# Patient Record
Sex: FEMALE | Race: WHITE | HISPANIC OR LATINO | Employment: FULL TIME | URBAN - METROPOLITAN AREA
[De-identification: names, ages, dates, MRNs, and addresses within clinical notes are randomized per-mention and may not be internally consistent; named-entity substitution may affect disease eponyms.]

---

## 2018-05-16 LAB
BILIRUB UR QL STRIP: NEGATIVE MG/DL
CLARITY UR: ABNORMAL
COLOR UR: YELLOW
COMMENT (HISTORICAL): ABNORMAL
GLUCOSE UR STRIP-MCNC: NEGATIVE MG/DL
HGB UR QL STRIP.AUTO: 50
KETONES UR STRIP-MCNC: NEGATIVE MG/DL
LEUKOCYTE ESTERASE UR QL STRIP: 25
NITRITE UR QL STRIP: NEGATIVE
PH UR STRIP.AUTO: 7 [PH] (ref 4.5–8)
PREGNANCY TEST URINE (HISTORICAL): NEGATIVE
PROT UR STRIP-MCNC: NEGATIVE MG/DL
SP GR UR STRIP.AUTO: 1.01 (ref 1–1.04)
UROBILINOGEN UR QL STRIP.AUTO: NEGATIVE MG/DL (ref 0–1)

## 2018-07-30 ENCOUNTER — OFFICE VISIT (OUTPATIENT)
Dept: FAMILY MEDICINE CLINIC | Facility: CLINIC | Age: 18
End: 2018-07-30
Payer: COMMERCIAL

## 2018-07-30 VITALS
DIASTOLIC BLOOD PRESSURE: 62 MMHG | HEART RATE: 89 BPM | BODY MASS INDEX: 30.32 KG/M2 | RESPIRATION RATE: 16 BRPM | WEIGHT: 182 LBS | OXYGEN SATURATION: 99 % | HEIGHT: 65 IN | TEMPERATURE: 98.9 F | SYSTOLIC BLOOD PRESSURE: 106 MMHG

## 2018-07-30 DIAGNOSIS — L30.9 ECZEMA, UNSPECIFIED TYPE: Primary | ICD-10-CM

## 2018-07-30 DIAGNOSIS — Z91.010 ALLERGY HISTORY, PEANUTS: ICD-10-CM

## 2018-07-30 DIAGNOSIS — J45.909 MILD ASTHMA WITHOUT COMPLICATION, UNSPECIFIED WHETHER PERSISTENT: ICD-10-CM

## 2018-07-30 PROCEDURE — 3008F BODY MASS INDEX DOCD: CPT | Performed by: SPECIALIST

## 2018-07-30 PROCEDURE — 99213 OFFICE O/P EST LOW 20 MIN: CPT | Performed by: SPECIALIST

## 2018-07-30 RX ORDER — EPINEPHRINE 0.15 MG/.3ML
0.15 INJECTION INTRAMUSCULAR ONCE
Qty: 0.3 ML | Refills: 0 | Status: SHIPPED | OUTPATIENT
Start: 2018-07-30 | End: 2019-03-11

## 2018-07-30 RX ORDER — METHOCARBAMOL 500 MG/1
500 TABLET, FILM COATED ORAL 2 TIMES DAILY
Refills: 0 | COMMUNITY
Start: 2018-05-20 | End: 2018-08-09

## 2018-07-30 RX ORDER — TRIAMCINOLONE ACETONIDE 1 MG/G
CREAM TOPICAL 2 TIMES DAILY
Qty: 30 G | Refills: 0 | Status: SHIPPED | OUTPATIENT
Start: 2018-07-30 | End: 2018-08-09

## 2018-07-30 RX ORDER — EPINEPHRINE 0.15 MG/.3ML
0.15 INJECTION INTRAMUSCULAR ONCE
COMMUNITY
End: 2018-07-30 | Stop reason: SDUPTHER

## 2018-07-30 NOTE — PATIENT INSTRUCTIONS
Avoid  Dust   And  Air  Fresheners   Smoke  And  Obvious  Allergens                   Flu  Vaccine  In  The  Fall 2018

## 2018-07-30 NOTE — PROGRESS NOTES
Assessment/Plan:pt  Has   Long  Hx  Allergies    Asthma   Eczema     Has  Epi  Kit   For  Severe  Allergic  Reactions  Refer  For  consultation  To  dermatiology         Diagnoses and all orders for this visit:    Eczema, unspecified type  -     triamcinolone (KENALOG) 0 1 % cream; Apply topically 2 (two) times a day  -     Ambulatory referral to Dermatology; Future    Allergy history, peanuts  -     EPINEPHrine (EPIPEN JR) 0 15 mg/0 3 mL SOAJ; Inject 0 3 mL (0 15 mg total) into a muscle once for 1 dose    Other orders  -     methocarbamol (ROBAXIN) 500 mg tablet; Take 500 mg by mouth 2 (two) times a day  -     Albuterol Sulfate (VENTOLIN HFA IN); Inhale  -     etonogestrel (NEXPLANON) subdermal implant; 68 mg by Subdermal route once  -     Discontinue: EPINEPHrine (EPIPEN JR) 0 15 mg/0 3 mL SOAJ; Inject 0 15 mg into a muscle once          Subjective:      Patient ID: Casey Gaines is a 19 y o  female  Patient is a 22-year-old female with a long history of eczema some on her left neck her abdomen but mostly                   anticubital area she also has a history of asthma which has improved but she has albuterol  in inhaler as needed she also has an EpiPen for severe reactions         The following portions of the patient's history were reviewed and updated as appropriate: allergies, current medications, past family history, past medical history, past social history, past surgical history and problem list     Review of Systems   Constitutional: Negative for activity change, appetite change, chills, diaphoresis, fatigue, fever and unexpected weight change  HENT: Negative for ear pain, hearing loss and sinus pain  Eyes: Negative for visual disturbance  Respiratory: Negative for cough, chest tightness and shortness of breath  Cardiovascular: Negative for chest pain  Gastrointestinal: Negative for abdominal distention and abdominal pain  Neurological: Negative for weakness  Psychiatric/Behavioral: Negative for agitation and behavioral problems  Objective:      /62 (BP Location: Left arm, Patient Position: Sitting, Cuff Size: Standard)   Pulse 89   Temp 98 9 °F (37 2 °C)   Resp 16   Ht 5' 5" (1 651 m)   Wt 82 6 kg (182 lb)   LMP 07/16/2018 (Approximate)   SpO2 99%   Breastfeeding? No   BMI 30 29 kg/m²          Physical Exam   Constitutional: She is oriented to person, place, and time  She appears well-developed and well-nourished  No distress  HENT:   Head: Normocephalic  Right Ear: External ear normal    Left Ear: External ear normal    Nose: Nose normal    Mouth/Throat: Oropharynx is clear and moist  No oropharyngeal exudate  Eyes: Conjunctivae are normal  Pupils are equal, round, and reactive to light  Neck: No JVD present  Cardiovascular: Normal rate, regular rhythm and normal heart sounds  Pulmonary/Chest: No respiratory distress  She has no wheezes  She has no rales  Abdominal: She exhibits no distension  There is no tenderness  Musculoskeletal: She exhibits no edema, tenderness or deformity  Neurological: She is alert and oriented to person, place, and time  No cranial nerve deficit  Coordination normal    Skin: Skin is warm and dry  Rash noted  She is not diaphoretic  No erythema  No pallor  Typical  Skin  Changes  antecubital  Area  Of  Eczema    Some  Left  Neck    Minimal  abd   Psychiatric: She has a normal mood and affect   Her behavior is normal

## 2018-08-09 ENCOUNTER — HOSPITAL ENCOUNTER (EMERGENCY)
Facility: HOSPITAL | Age: 18
Discharge: HOME | End: 2018-08-09
Attending: EMERGENCY MEDICINE | Admitting: EMERGENCY MEDICINE
Payer: COMMERCIAL

## 2018-08-09 ENCOUNTER — APPOINTMENT (EMERGENCY)
Dept: RADIOLOGY | Facility: HOSPITAL | Age: 18
End: 2018-08-09
Payer: COMMERCIAL

## 2018-08-09 VITALS
WEIGHT: 183 LBS | TEMPERATURE: 98.7 F | RESPIRATION RATE: 19 BRPM | BODY MASS INDEX: 30.45 KG/M2 | OXYGEN SATURATION: 98 % | SYSTOLIC BLOOD PRESSURE: 131 MMHG | HEART RATE: 87 BPM | DIASTOLIC BLOOD PRESSURE: 78 MMHG

## 2018-08-09 DIAGNOSIS — R06.02 SOB (SHORTNESS OF BREATH): ICD-10-CM

## 2018-08-09 DIAGNOSIS — J45.901 ASTHMA EXACERBATION: Primary | ICD-10-CM

## 2018-08-09 LAB
EXT PREG TEST URINE: NORMAL
S PYO AG THROAT QL: NEGATIVE

## 2018-08-09 PROCEDURE — 71046 X-RAY EXAM CHEST 2 VIEWS: CPT

## 2018-08-09 PROCEDURE — 87430 STREP A AG IA: CPT | Performed by: EMERGENCY MEDICINE

## 2018-08-09 PROCEDURE — 99284 EMERGENCY DEPT VISIT MOD MDM: CPT

## 2018-08-09 PROCEDURE — 93005 ELECTROCARDIOGRAM TRACING: CPT

## 2018-08-09 PROCEDURE — 94640 AIRWAY INHALATION TREATMENT: CPT

## 2018-08-09 PROCEDURE — 81025 URINE PREGNANCY TEST: CPT | Performed by: EMERGENCY MEDICINE

## 2018-08-09 RX ORDER — PREDNISONE 20 MG/1
60 TABLET ORAL ONCE
Status: COMPLETED | OUTPATIENT
Start: 2018-08-09 | End: 2018-08-09

## 2018-08-09 RX ORDER — PREDNISONE 20 MG/1
TABLET ORAL
Status: COMPLETED
Start: 2018-08-09 | End: 2018-08-09

## 2018-08-09 RX ORDER — PREDNISONE 20 MG/1
60 TABLET ORAL DAILY
Qty: 15 TABLET | Refills: 0 | Status: SHIPPED | OUTPATIENT
Start: 2018-08-09 | End: 2018-08-13

## 2018-08-09 RX ORDER — ALBUTEROL SULFATE 2.5 MG/3ML
2.5 SOLUTION RESPIRATORY (INHALATION) EVERY 6 HOURS PRN
Qty: 75 ML | Refills: 0 | Status: SHIPPED | OUTPATIENT
Start: 2018-08-09 | End: 2019-03-11

## 2018-08-09 RX ORDER — IPRATROPIUM BROMIDE AND ALBUTEROL SULFATE 2.5; .5 MG/3ML; MG/3ML
3 SOLUTION RESPIRATORY (INHALATION) ONCE
Status: COMPLETED | OUTPATIENT
Start: 2018-08-09 | End: 2018-08-09

## 2018-08-09 RX ORDER — IPRATROPIUM BROMIDE AND ALBUTEROL SULFATE 2.5; .5 MG/3ML; MG/3ML
SOLUTION RESPIRATORY (INHALATION)
Status: COMPLETED
Start: 2018-08-09 | End: 2018-08-09

## 2018-08-09 RX ADMIN — IPRATROPIUM BROMIDE AND ALBUTEROL SULFATE 3 ML: 2.5; .5 SOLUTION RESPIRATORY (INHALATION) at 22:10

## 2018-08-09 RX ADMIN — PREDNISONE 60 MG: 20 TABLET ORAL at 22:10

## 2018-08-09 RX ADMIN — IPRATROPIUM BROMIDE AND ALBUTEROL SULFATE 3 ML: .5; 3 SOLUTION RESPIRATORY (INHALATION) at 22:10

## 2018-08-10 LAB
ATRIAL RATE: 73 BPM
ATRIAL RATE: 75 BPM
P AXIS: 42 DEGREES
P AXIS: 55 DEGREES
PR INTERVAL: 144 MS
PR INTERVAL: 150 MS
QRS AXIS: 22 DEGREES
QRS AXIS: 23 DEGREES
QRSD INTERVAL: 76 MS
QRSD INTERVAL: 80 MS
QT INTERVAL: 398 MS
QT INTERVAL: 400 MS
QTC INTERVAL: 438 MS
QTC INTERVAL: 446 MS
T WAVE AXIS: 10 DEGREES
T WAVE AXIS: 8 DEGREES
VENTRICULAR RATE: 73 BPM
VENTRICULAR RATE: 75 BPM

## 2018-08-10 PROCEDURE — 93010 ELECTROCARDIOGRAM REPORT: CPT | Performed by: INTERNAL MEDICINE

## 2018-08-10 NOTE — DISCHARGE INSTRUCTIONS
Asthma   WHAT YOU NEED TO KNOW:   Asthma is a lung disease that makes breathing difficult  Chronic inflammation and reactions to triggers narrow the airways in the lungs  Asthma can become life-threatening if it is not managed  DISCHARGE INSTRUCTIONS:   Seek care immediately if:   · You have severe shortness of breath  · Your lips or nails turn blue or gray  · The skin around your neck and ribs pulls in with each breath  · You have shortness of breath, even after you take your short-term medicine as directed  · Your peak flow numbers are in the red zone of your AAP  Contact your healthcare provider if:   · You run out of medicine before your next refill is due  · Your symptoms get worse  · You need to take more medicine than usual to control your symptoms  · You have questions or concerns about your condition or care  Medicines:   · Medicines  decrease inflammation, open airways, and make it easier to breathe  Medicines may be inhaled, taken as a pill, or injected  Short-term medicines relieve your symptoms quickly  Long-term medicines are used to prevent future attacks  You may also need medicine to help control your allergies  Ask your healthcare provider for more information about the medicine you are given and how to take it safely  · Take your medicine as directed  Contact your healthcare provider if you think your medicine is not helping or if you have side effects  Tell him or her if you are allergic to any medicine  Keep a list of the medicines, vitamins, and herbs you take  Include the amounts, and when and why you take them  Bring the list or the pill bottles to follow-up visits  Carry your medicine list with you in case of an emergency  Follow up with your healthcare provider as directed: You will need to return to make sure your medicine is working and your symptoms are controlled   You may be referred to an asthma specialist  iMtch Fonseca may be asked to keep a record of your peak flow values and bring it with you to your appointments  Write down your questions so you remember to ask them  Manage your symptoms and prevent future attacks:   · Follow your Asthma Action Plan (AAP)  This is a written plan that you and your healthcare provider create  It explains which medicine you need and when to change doses if necessary  It also explains how you can monitor symptoms and use a peak flow meter  The meter measures how well your lungs are working  · Manage other health conditions , such as allergies, acid reflux, and sleep apnea  · Identify and avoid triggers  These may include pets, dust mites, mold, and cockroaches  · Do not smoke or be around others who smoke  Nicotine and other chemicals in cigarettes and cigars can cause lung damage  Ask your healthcare provider for information if you currently smoke and need help to quit  E-cigarettes or smokeless tobacco still contain nicotine  Talk to your healthcare provider before you use these products  · Ask about the flu vaccine  The flu can make your asthma worse  You may need a yearly flu shot  © 2017 [facility] Information is for End User's use only and may not be sold, redistributed or otherwise used for commercial purposes  All illustrations and images included in CareNotes® are the copyrighted property of [employer]  or [employer]  The above information is an  only  It is not intended as medical advice for individual conditions or treatments  Talk to your doctor, nurse or pharmacist before following any medical regimen to see if it is safe and effective for you  Shortness of Breath   AMBULATORY CARE:   Shortness of breath  is a feeling that you cannot get enough air when you breathe in  You may have this feeling only during activity, or all the time  Your symptoms can range from mild to severe   Shortness of breath may be a sign of a serious health condition that needs immediate care  Seek care immediately if:   · Your signs and symptoms are the same or worse within 24 hours of treatment  · The skin over your ribs or on your neck sinks in when you breathe  · You feel confused or dizzy  Contact your healthcare provider if:   · You have new or worsening symptoms  · You have questions or concerns about your condition or care  Treatment:   · Medicines  may be used to treat the cause of your symptoms  You may need medicine to treat a bacterial infection or reduce anxiety  Other medicines may be used to open your airway, reduce swelling, or remove extra fluid  If you have a heart condition, you may need medicine to help your heart beat more strongly or regularly  · Oxygen  may be given to help you breathe more easily  Manage shortness of breath:   · Create an action plan  You and your healthcare provider can work together to create a plan for how to handle shortness of breath  The plan can include daily activities, treatment changes, and what to do if you have severe breathing problems  · Lean forward on your elbows when you sit  This helps your lungs expand and may make it easier to breathe  · Use pursed-lip breathing any time you feel short of breath  Breathe in through your nose and then slowly breathe out through your mouth with your lips slightly puckered  It should take you twice as long to breathe out as it did to breathe in  · Do not smoke  Nicotine and other chemicals in cigarettes and cigars can cause lung damage and make shortness of breath worse  Ask your healthcare provider for information if you currently smoke and need help to quit  E-cigarettes or smokeless tobacco still contain nicotine  Talk to your healthcare provider before you use these products  · Reach or maintain a healthy weight  Your healthcare provider can help you create a safe weight loss plan if you are overweight  · Exercise as directed    Exercise can help your lungs work more easily  Exercise can also help you lose weight if needed  Try to get at least 30 minutes of exercise most days of the week  Follow up with your healthcare provider or specialist as directed:  Write down your questions so you remember to ask them during your visits  © 2017 [address] Information is for End User's use only and may not be sold, redistributed or otherwise used for commercial purposes  All illustrations and images included in CareNotes® are the copyrighted property of [employer]  or [employer] Parks  The above information is an  only  It is not intended as medical advice for individual conditions or treatments  Talk to your doctor, nurse or pharmacist before following any medical regimen to see if it is safe and effective for you  Wheezing   WHAT YOU NEED TO KNOW:   Wheezing happens when air flows through a narrowed airway  Wheezing can happen when you breathe in, breathe out, or both  Wheezes may sound like a whistle, squeal, groan, or creak  Wheezes may also sound musical or high-pitched  Wheezing cannot be stopped by coughing  Asthma, allergies, or infection are the most common causes of wheezing  A foreign body, asthma, extra mucus, or smoking can also cause wheezing  DISCHARGE INSTRUCTIONS:   Call 911 if:   · You have sudden trouble breathing  · Your throat feels like it is swelling or feels tight  · You are dizzy, lightheaded, confused, or feel faint  · You have chest pain or tightness  Return to the emergency department if:   · You have shortness of breath  · You are coughing up blood  · You have chest pain  Contact your healthcare provider if:   · You have a fever  · Your wheezing does not get better or it gets worse  · You have questions or concerns about your condition or care  Medicines:   · Medicines  decrease inflammation, open airways, and make it easier to breathe       · Take your medicine as directed  Contact your healthcare provider if you think your medicine is not helping or if you have side effects  Tell him of her if you are allergic to any medicine  Keep a list of the medicines, vitamins, and herbs you take  Include the amounts, and when and why you take them  Bring the list or the pill bottles to follow-up visits  Carry your medicine list with you in case of an emergency  Follow up with your healthcare provider as directed: You may be referred to a specialist  Write down your questions so you remember to ask them during your visits  Manage your symptoms:   · Avoid allergy triggers , such as animals, grass, pollen, or dust     · Return to your usual activity as directed  You may need to limit certain activities until you follow up with your healthcare provider or your symptoms improve  Your child may need to avoid sports until his symptoms improve  · Take deep breaths and cough several times a day  This will decrease your risk for a lung infection and help decrease wheezing  Take a deep breath and hold it for as long as you can  Let the air out and then cough strongly  Deep breaths help open your airway  You may be given an incentive spirometer to help you take deep breaths  Put the plastic piece in your mouth and take a slow, deep breath, then let the air out and cough  Repeat these steps 10 times every hour  · Drink liquids as directed  You may need to drink more liquids than usual to thin your mucus and prevent dehydration  Ask how much liquid to drink each day and which liquids are best for you  © 2017 [address] [employer] Information is for End User's use only and may not be sold, redistributed or otherwise used for commercial purposes  All illustrations and images included in CareNotes® are the copyrighted property of [employer]  or [employer] Parks  The above information is an  only   It is not intended as medical advice for individual conditions or treatments  Talk to your doctor, nurse or pharmacist before following any medical regimen to see if it is safe and effective for you

## 2018-08-10 NOTE — ED NOTES
Reports her asthma has been acting up since yesterday  Reports productive cough  Denies fevers   rEports her pump isnt helping     Audrey Caballero, KHALIDA  08/09/18 5948

## 2018-08-10 NOTE — ED PROVIDER NOTES
History  Chief Complaint   Patient presents with    Asthma     Pt has been coughing for several days  STates today has had increasing tightness in chest with coughing, used nebulizer and inhaler w/o relief  Family states "used albuterol inhaler and sodium chloride in nebulizer"  Pt has been hospitalized for asthma as a small child but never intubated    Asthma     Lungs auscultated in triage, slightly diminished but otherwise clear, VSS in triage including SPO2 of between 98-99% and pt speaking in full sentences     Patient is a 75-year-old female with a history of asthma coming in for increasing shortness of breath over the past several days  Patient states she is not on any birth control, had recent surgeries, travel, hemoptysis, fevers  Patient states she was using her inhalers without any relief  She feels my whole lungs and chest are tight  She has no shortness of breath with activity or dyspnea on exertion  Patient does states she has a sore throat and her brother had strep  She has been tolerating p o  well with good urine output and good bowel movements  Patient states that she does not know if she has ever been intubated for her asthma and father cannot recall the last time she was on prednisone        History provided by:  Patient   used: No    Asthma   Severity:  Mild  Onset quality:  Gradual  Timing:  Intermittent  Progression:  Waxing and waning  Chronicity:  Recurrent  Associated symptoms: cough (clear sputum), shortness of breath and sore throat    Associated symptoms: no abdominal pain, no chest pain, no congestion, no ear pain, no fatigue, no fever, no headaches, no loss of consciousness, no myalgias, no nausea, no rash and no vomiting        Prior to Admission Medications   Prescriptions Last Dose Informant Patient Reported? Taking?    Albuterol Sulfate (VENTOLIN HFA IN)  Self Yes No   Sig: Inhale   EPINEPHrine (EPIPEN JR) 0 15 mg/0 3 mL SOAJ   No No   Sig: Inject 0 3 mL (0 15 mg total) into a muscle once for 1 dose   etonogestrel (NEXPLANON) subdermal implant   Yes No   Si mg by Subdermal route once      Facility-Administered Medications: None       Past Medical History:   Diagnosis Date    Allergic     Asthma     Eczema        No past surgical history on file  Family History   Problem Relation Age of Onset    Family history unknown: Yes     I have reviewed and agree with the history as documented  Social History   Substance Use Topics    Smoking status: Never Smoker    Smokeless tobacco: Never Used    Alcohol use No        Review of Systems   Constitutional: Negative for diaphoresis, fatigue and fever  HENT: Positive for sore throat  Negative for congestion and ear pain  Eyes: Negative for visual disturbance  Respiratory: Positive for cough (clear sputum) and shortness of breath  Negative for chest tightness  Cardiovascular: Negative for chest pain and palpitations  Gastrointestinal: Negative for abdominal pain, nausea and vomiting  Genitourinary: Negative for difficulty urinating and dysuria  Musculoskeletal: Negative for back pain, myalgias and neck pain  Skin: Negative for rash  Neurological: Negative for loss of consciousness, weakness and headaches  Psychiatric/Behavioral: Negative for confusion  All other systems reviewed and are negative  Physical Exam  Physical Exam   Constitutional: She is oriented to person, place, and time  She appears well-developed and well-nourished  No distress  HENT:   Head: Normocephalic and atraumatic  Mouth/Throat: Oropharynx is clear and moist    Patient is maintaining airway and maintaining secretion  Uvula midline without any edema  No pharyngeal erythema or exudate  No cervical chain lymphadenopathy  Eyes: Conjunctivae and EOM are normal  Pupils are equal, round, and reactive to light  Neck: Normal range of motion  Neck supple     Cardiovascular: Normal rate, regular rhythm, normal heart sounds and intact distal pulses  No murmur heard  Pulmonary/Chest: Effort normal and breath sounds normal  No stridor  No respiratory distress  She exhibits no tenderness  Abdominal: Soft  Bowel sounds are normal  She exhibits no distension  There is no tenderness  Musculoskeletal: Normal range of motion  She exhibits no edema  Neurological: She is alert and oriented to person, place, and time  No cranial nerve deficit  Skin: Skin is warm  Capillary refill takes less than 2 seconds  She is not diaphoretic  Psychiatric: She has a normal mood and affect  Her behavior is normal  Judgment and thought content normal    Nursing note and vitals reviewed        Vital Signs  ED Triage Vitals [08/09/18 2136]   Temperature Pulse Respirations Blood Pressure SpO2   98 7 °F (37 1 °C) 87 19 131/78 98 %      Temp Source Heart Rate Source Patient Position - Orthostatic VS BP Location FiO2 (%)   Temporal Monitor Sitting Left arm --      Pain Score       6           Vitals:    08/09/18 2136   BP: 131/78   Pulse: 87   Patient Position - Orthostatic VS: Sitting       Visual Acuity      ED Medications  Medications   ipratropium-albuterol (DUO-NEB) 0 5-2 5 mg/3 mL inhalation solution 3 mL (3 mL Nebulization Given 8/9/18 2210)   predniSONE tablet 60 mg (60 mg Oral Given 8/9/18 2210)       Diagnostic Studies  Results Reviewed     Procedure Component Value Units Date/Time    Rapid Strep A Screen Only, Adults [[id number]]  (Normal) Collected:  08/09/18 2216    Lab Status:  Final result Specimen:  Throat from Throat Updated:  08/09/18 2240     Rapid Strep A Screen Negative    POCT pregnancy, urine [[id number]]  (Normal) Resulted:  08/09/18 2216    Lab Status:  Final result Updated:  08/09/18 2216     EXT PREG TEST UR (Ref: Negative) neg preg                 XR chest 2 views    (Results Pending)              Procedures  Procedures       Phone Contacts  ED Phone Contact    ED Course                               MDM  Number of Diagnoses or Management Options  Asthma exacerbation:   SOB (shortness of breath):   Diagnosis management comments:   Patient is a 78-year-old female complaining of increasing shortness of breath  On exam patient is nontoxic appearing in no respiratory distress  Will obtain chest x-ray, rapid strep, urine as well as give DuoNeb and prednisone  EKG INTERPRETATION at 10:21 p m  RHYTHM:  Normal sinus rhythm at 75 beats per minute  AXIS: normal axis  INTERVALS:  Normal axis measured at 150 milliseconds  QRS COMPLEX:  Measured at 76 milliseconds  ST SEGMENT:  Nonspecific ST segment changes  Artifact present  Poor R-wave progression  T-wave flattening from V2 V3 as well as AVF  QT INTERVAL:  QTC measured at 446 millisecond  COMPARED WITH PRIOR   Tomer Allen Interpretation by Adelaida Baer DO    10:53 PM  Upon re-evaluation patient feels better  Lungs clear no respiratory distress  Discussed with patient and family follow-up as well as will give prednisone for 4 days  Per father she does need albuterol solution  Portions of the record may have been created with voice recognition software  Occasional wrong word or "sound a like" substitutions may have occurred due to the inherent limitations of voice recognition software  Read the chart carefully and recognize, using context, where substitutions have occurred           Amount and/or Complexity of Data Reviewed  Clinical lab tests: ordered and reviewed  Tests in the radiology section of CPT®: ordered and reviewed  Tests in the medicine section of CPT®: ordered and reviewed  Independent visualization of images, tracings, or specimens: yes (No acute pathology on chest xray )      CritCare Time    Disposition  Final diagnoses:   Asthma exacerbation   SOB (shortness of breath)     Time reflects when diagnosis was documented in both MDM as applicable and the Disposition within this note     Time User Action Codes Description Comment    8/9/2018 10:42 PM Filipe Ramachandran Add [J45 901] Asthma exacerbation     8/9/2018 10:42 PM Matt Lopez Add [R06 02] SOB (shortness of breath)       ED Disposition     ED Disposition Condition Comment    Discharge  [facility] discharge to home/self care  Condition at discharge: Stable        Follow-up Information     Follow up With Specialties Details Why Contact Info      Schedule an appointment as soon as possible for a visit in 2 days      Theo Ng MD Internal Medicine   [address] [place] [address]  [phone]            Patient's Medications   Discharge Prescriptions    ALBUTEROL (2 5 MG/3 ML) 0 083 % NEBULIZER SOLUTION    Take 1 vial (2 5 mg total) by nebulization every 6 (six) hours as needed for wheezing or shortness of breath       Start Date: 8/9/2018  End Date: --       Order Dose: 2 5 mg       Quantity: 75 mL    Refills: 0    PREDNISONE 20 MG TABLET    Take 3 tablets (60 mg total) by mouth daily for 4 days       Start Date: 8/9/2018  End Date: 8/13/2018       Order Dose: 60 mg       Quantity: 15 tablet    Refills: 0     No discharge procedures on file      ED Provider  Electronically Signed by           Janell Mays DO  08/09/18 1961

## 2018-09-17 ENCOUNTER — OPTICAL OFFICE (OUTPATIENT)
Dept: URBAN - METROPOLITAN AREA CLINIC 143 | Facility: CLINIC | Age: 18
Setting detail: OPHTHALMOLOGY
End: 2018-09-17
Payer: COMMERCIAL

## 2018-09-17 ENCOUNTER — DOCTOR'S OFFICE (OUTPATIENT)
Dept: URBAN - METROPOLITAN AREA CLINIC 136 | Facility: CLINIC | Age: 18
Setting detail: OPHTHALMOLOGY
End: 2018-09-17
Payer: COMMERCIAL

## 2018-09-17 DIAGNOSIS — H52.03: ICD-10-CM

## 2018-09-17 DIAGNOSIS — H53.042: ICD-10-CM

## 2018-09-17 PROCEDURE — V2100 LENS SPHER SINGLE PLANO 4.00: HCPCS | Performed by: OPTOMETRIST

## 2018-09-17 PROCEDURE — V2784 LENS POLYCARB OR EQUAL: HCPCS | Performed by: OPTOMETRIST

## 2018-09-17 PROCEDURE — 92004 COMPRE OPH EXAM NEW PT 1/>: CPT | Performed by: OPTOMETRIST

## 2018-09-17 PROCEDURE — V2020 VISION SVCS FRAMES PURCHASES: HCPCS | Performed by: OPTOMETRIST

## 2018-09-17 ASSESSMENT — REFRACTION_AUTOREFRACTION
OS_CYLINDER: -0.25
OD_CYLINDER: -0.50
OS_AXIS: 165
OD_AXIS: 166
OS_SPHERE: +2.25
OD_SPHERE: +0.50

## 2018-09-17 ASSESSMENT — REFRACTION_MANIFEST
OS_SPHERE: +3.00
OS_SPHERE: +1.75
OU_VA: 20/20
OD_SPHERE: +0.75
OD_VA2: 20/20
OD_VA3: 20/
OS_VA1: 20/20
OD_CYLINDER: SPH
OS_CYLINDER: SPH
OS_CYLINDER: SPH
OS_VA2: 20/
OD_VA1: 20/20
OS_VA2: 20/20
OS_CYLINDER: SPH
OD_CYLINDER: SPH
OD_SPHERE: +2.00
OS_VA3: 20/
OD_CYLINDER: SPH
OS_SPHERE: +3.50
OU_VA: 20/20
OD_SPHERE: +1.50
OS_VA3: 20/
OD_VA2: 20/
OS_VA1: 20/20
OD_VA3: 20/
OD_VA1: 20/20

## 2018-09-17 ASSESSMENT — SPHEQUIV_DERIVED
OS_SPHEQUIV: 2.125
OD_SPHEQUIV: 0.25

## 2018-09-17 ASSESSMENT — REFRACTION_CURRENTRX
OS_OVR_VA: 20/
OS_OVR_VA: 20/
OD_OVR_VA: 20/
OS_OVR_VA: 20/
OD_OVR_VA: 20/
OD_OVR_VA: 20/

## 2018-09-17 ASSESSMENT — CONFRONTATIONAL VISUAL FIELD TEST (CVF)
OD_FINDINGS: FULL
OS_FINDINGS: FULL

## 2018-09-17 ASSESSMENT — VISUAL ACUITY
OS_BCVA: 20/25-1
OD_BCVA: 20/25

## 2018-09-28 ENCOUNTER — HOSPITAL ENCOUNTER (EMERGENCY)
Facility: HOSPITAL | Age: 18
Discharge: HOME | End: 2018-09-28
Attending: EMERGENCY MEDICINE | Admitting: EMERGENCY MEDICINE
Payer: COMMERCIAL

## 2018-09-28 VITALS
SYSTOLIC BLOOD PRESSURE: 138 MMHG | TEMPERATURE: 98.2 F | OXYGEN SATURATION: 100 % | RESPIRATION RATE: 18 BRPM | WEIGHT: 183.42 LBS | BODY MASS INDEX: 30.52 KG/M2 | HEART RATE: 85 BPM | DIASTOLIC BLOOD PRESSURE: 77 MMHG

## 2018-09-28 DIAGNOSIS — J02.9 PHARYNGITIS: Primary | ICD-10-CM

## 2018-09-28 PROCEDURE — 99282 EMERGENCY DEPT VISIT SF MDM: CPT

## 2018-09-28 RX ORDER — CLINDAMYCIN HYDROCHLORIDE 150 MG/1
300 CAPSULE ORAL EVERY 8 HOURS SCHEDULED
Qty: 60 CAPSULE | Refills: 0 | Status: SHIPPED | OUTPATIENT
Start: 2018-09-28 | End: 2018-10-08

## 2018-09-28 NOTE — DISCHARGE INSTRUCTIONS

## 2018-11-03 ENCOUNTER — HOSPITAL ENCOUNTER (EMERGENCY)
Facility: HOSPITAL | Age: 18
Discharge: HOME | End: 2018-11-03
Attending: EMERGENCY MEDICINE | Admitting: EMERGENCY MEDICINE
Payer: COMMERCIAL

## 2018-11-03 VITALS
HEIGHT: 67 IN | RESPIRATION RATE: 18 BRPM | TEMPERATURE: 97.6 F | HEART RATE: 84 BPM | WEIGHT: 180.4 LBS | DIASTOLIC BLOOD PRESSURE: 84 MMHG | OXYGEN SATURATION: 98 % | BODY MASS INDEX: 28.31 KG/M2 | SYSTOLIC BLOOD PRESSURE: 124 MMHG

## 2018-11-03 DIAGNOSIS — N39.0 UTI (URINARY TRACT INFECTION): Primary | ICD-10-CM

## 2018-11-03 LAB
BACTERIA UR QL AUTO: ABNORMAL /HPF
BILIRUB UR QL STRIP: NEGATIVE
CLARITY UR: CLEAR
COLOR UR: YELLOW
EXT PREG TEST URINE: NEGATIVE
GLUCOSE UR STRIP-MCNC: NEGATIVE MG/DL
HGB UR QL STRIP.AUTO: 50
KETONES UR STRIP-MCNC: NEGATIVE MG/DL
LEUKOCYTE ESTERASE UR QL STRIP: 100
NITRITE UR QL STRIP: NEGATIVE
NON-SQ EPI CELLS URNS QL MICRO: ABNORMAL /HPF
PH UR STRIP.AUTO: 6 [PH] (ref 4.5–8)
PROT UR STRIP-MCNC: NEGATIVE MG/DL
RBC #/AREA URNS AUTO: ABNORMAL /HPF
SP GR UR STRIP.AUTO: 1.02 (ref 1–1.04)
UROBILINOGEN UA: NEGATIVE MG/DL
WBC #/AREA URNS AUTO: ABNORMAL /HPF

## 2018-11-03 PROCEDURE — 81001 URINALYSIS AUTO W/SCOPE: CPT | Performed by: EMERGENCY MEDICINE

## 2018-11-03 PROCEDURE — 99283 EMERGENCY DEPT VISIT LOW MDM: CPT

## 2018-11-03 PROCEDURE — 81025 URINE PREGNANCY TEST: CPT | Performed by: PHYSICIAN ASSISTANT

## 2018-11-03 PROCEDURE — 81003 URINALYSIS AUTO W/O SCOPE: CPT | Performed by: EMERGENCY MEDICINE

## 2018-11-03 RX ORDER — NITROFURANTOIN 25; 75 MG/1; MG/1
100 CAPSULE ORAL 2 TIMES DAILY
Qty: 10 CAPSULE | Refills: 0 | Status: SHIPPED | OUTPATIENT
Start: 2018-11-03 | End: 2019-03-11

## 2018-11-03 NOTE — DISCHARGE INSTRUCTIONS
Urinary Tract Infection in Women, Ambulatory Care   GENERAL INFORMATION:   A urinary tract infection (UTI)  is caused by bacteria that get inside your urinary tract  Most bacteria that enter your urinary tract are expelled when you urinate  If the bacteria stay in your urinary tract, you may get an infection  Your urinary tract includes your kidneys, ureters, bladder, and urethra  Urine is made in your kidneys, and it flows from the ureters to the bladder  Urine leaves the bladder through the urethra  A UTI is more common in your lower urinary tract, which includes your bladder and urethra  Common symptoms include the following:   · Urinating more often or waking from sleep to urinate    · Pain or burning when you urinate    · Pain or pressure in your lower abdomen     · Urine that smells bad    · Blood in your urine    · Leaking urine  Seek immediate care for the following symptoms:   · Urinating very little or not at all    · Vomiting    · Shaking chills with a fever    · Side or back pain that gets worse  Treatment for a UTI  may include medicines to treat a bacterial infection  You may also need medicines to decrease pain and burning, or decrease the urge to urinate often  Prevent a UTI:   · Urinate when you feel the urge  Do not hold your urine  Urinate as soon as you feel you have to  · Drink liquids as directed  Ask how much liquid to drink each day and which liquids are best for you  You may need to drink more fluids than usual to help flush out the bacteria  Do not drink alcohol, caffeine, and citrus juices  These can irritate your bladder and increase your symptoms  · Apply heat  on your abdomen for 20 to 30 minutes every 2 hours for as many days as directed  Heat helps decrease discomfort and pressure in your bladder  Follow up with your healthcare provider as directed:  Write down your questions so you remember to ask them during your visits     CARE AGREEMENT:   You have the right to help plan your care  Learn about your health condition and how it may be treated  Discuss treatment options with your caregivers to decide what care you want to receive  You always have the right to refuse treatment  The above information is an  only  It is not intended as medical advice for individual conditions or treatments  Talk to your doctor, nurse or pharmacist before following any medical regimen to see if it is safe and effective for you  © 2014 [address] is for End User's use only and may not be sold, redistributed or otherwise used for commercial purposes  All illustrations and images included in CareNotes® are the copyrighted property of [employer]  or Андрей Parks

## 2018-11-03 NOTE — ED PROVIDER NOTES
History  Chief Complaint   Patient presents with    Possible UTI     patient states that she is having burning and frequency while urinating started monday, denies back pain, denies fevers, states took OTC meds, AZO,  to help with UTI but it didn't help       History provided by:  Patient   used: No    Medical Problem   Location:  Pt with buring  with urination   Severity:  Mild  Onset quality:  Gradual  Duration:  4 days  Timing:  Constant  Chronicity:  Recurrent  Associated symptoms: no abdominal pain, no chest pain, no congestion, no cough, no diarrhea, no ear pain, no fatigue, no fever, no headaches, no loss of consciousness, no myalgias, no nausea, no rash, no rhinorrhea, no shortness of breath, no sore throat, no vomiting and no wheezing        Prior to Admission Medications   Prescriptions Last Dose Informant Patient Reported? Taking? Albuterol Sulfate (VENTOLIN HFA IN)  Self Yes No   Sig: Inhale   EPINEPHrine (EPIPEN JR) 0 15 mg/0 3 mL SOAJ   No No   Sig: Inject 0 3 mL (0 15 mg total) into a muscle once for 1 dose   albuterol (2 5 mg/3 mL) 0 083 % nebulizer solution   No No   Sig: Take 1 vial (2 5 mg total) by nebulization every 6 (six) hours as needed for wheezing or shortness of breath   etonogestrel (NEXPLANON) subdermal implant   Yes No   Si mg by Subdermal route once      Facility-Administered Medications: None       Past Medical History:   Diagnosis Date    Allergic     Asthma     Eczema        History reviewed  No pertinent surgical history  Family History   Problem Relation Age of Onset    Family history unknown: Yes     I have reviewed and agree with the history as documented  Social History   Substance Use Topics    Smoking status: Never Smoker    Smokeless tobacco: Never Used    Alcohol use No        Review of Systems   Constitutional: Negative  Negative for fatigue and fever  HENT: Negative    Negative for congestion, ear pain, rhinorrhea and sore throat  Eyes: Negative  Respiratory: Negative  Negative for cough, shortness of breath and wheezing  Cardiovascular: Negative for chest pain  Gastrointestinal: Negative  Negative for abdominal pain, diarrhea, nausea and vomiting  Endocrine: Negative  Genitourinary: Positive for dysuria  Musculoskeletal: Negative  Negative for myalgias  Skin: Negative  Negative for rash  Allergic/Immunologic: Negative  Neurological: Negative  Negative for loss of consciousness and headaches  Hematological: Negative  Psychiatric/Behavioral: Negative  All other systems reviewed and are negative  Physical Exam  Physical Exam   Constitutional: She is oriented to person, place, and time  She appears well-developed and well-nourished  HENT:   Head: Normocephalic and atraumatic  Right Ear: External ear normal    Left Ear: External ear normal    Nose: Nose normal    Mouth/Throat: Oropharynx is clear and moist    Eyes: Pupils are equal, round, and reactive to light  Conjunctivae and EOM are normal    Neck: Normal range of motion  Neck supple  Cardiovascular: Normal rate, regular rhythm and normal heart sounds  Pulmonary/Chest: Effort normal and breath sounds normal    Abdominal: Soft  Bowel sounds are normal    Suprapubic pressure    Musculoskeletal: Normal range of motion  Neurological: She is alert and oriented to person, place, and time  Psychiatric: She has a normal mood and affect  Her behavior is normal    Nursing note and vitals reviewed        Vital Signs  ED Triage Vitals [11/03/18 1149]   Temperature Pulse Respirations Blood Pressure SpO2   97 6 °F (36 4 °C) 84 18 124/84 98 %      Temp Source Heart Rate Source Patient Position - Orthostatic VS BP Location FiO2 (%)   Tympanic Monitor Lying Left arm --      Pain Score       --           Vitals:    11/03/18 1149   BP: 124/84   Pulse: 84   Patient Position - Orthostatic VS: Lying       Visual Acuity      ED Medications  Medications - No data to display    Diagnostic Studies  Results Reviewed     Procedure Component Value Units Date/Time    Urine Microscopic [[id number]]  (Abnormal) Collected:  11/03/18 1159    Lab Status:  Final result Specimen:  Urine from Urine, Clean Catch Updated:  11/03/18 1235     RBC, UA 1-2 (A) /hpf      WBC, UA 4-10 (A) /hpf      Epithelial Cells Occasional /hpf      Bacteria, UA None Seen /hpf     UA w Reflex to Microscopic w Reflex to Culture [[id number]]  (Abnormal) Collected:  11/03/18 1159    Lab Status:  Final result Specimen:  Urine from Urine, Clean Catch Updated:  11/03/18 1227     Color, UA Yellow     Clarity, UA Clear     Specific [place], UA 1 020     pH, UA 6 0     Leukocytes,  0 (A)     Nitrite, UA Negative     Protein, UA Negative mg/dl      Glucose, UA Negative mg/dl      Ketones, UA Negative mg/dl      Bilirubin, UA Negative     Blood, UA 50 0 (A)     UROBILINOGEN UA Negative mg/dL     POCT pregnancy, urine [[id number]]  (Normal) Resulted:  11/03/18 1200    Lab Status:  Final result Updated:  11/03/18 1200     EXT PREG TEST UR (Ref: Negative) negative                 No orders to display              Procedures  Procedures       Phone Contacts  ED Phone Contact    ED Course                               MDM  CritCare Time    Disposition  Final diagnoses:   UTI (urinary tract infection)     Time reflects when diagnosis was documented in both MDM as applicable and the Disposition within this note     Time User Action Codes Description Comment    11/3/2018 12:12 PM Anni Sarkar Add [N39 0] UTI (urinary tract infection)       ED Disposition     ED Disposition Condition Comment    Discharge  Hannah Cheatham discharge to home/self care      Condition at discharge: Good        Follow-up Information     Follow up With Specialties Details Why Contact Info Additional [facility] Schedule an appointment as soon as possible for a visit [address], [address] [facility], [address], [address], [phone]          Discharge Medication List as of 11/3/2018 12:13 PM      START taking these medications    Details   nitrofurantoin (MACROBID) 100 mg capsule Take 1 capsule (100 mg total) by mouth 2 (two) times a day, Starting Sat 11/3/2018, Print         CONTINUE these medications which have NOT CHANGED    Details   albuterol (2 5 mg/3 mL) 0 083 % nebulizer solution Take 1 vial (2 5 mg total) by nebulization every 6 (six) hours as needed for wheezing or shortness of breath, Starting Thu 8/9/2018, Print      Albuterol Sulfate (VENTOLIN HFA IN) Inhale, Historical Med      EPINEPHrine (EPIPEN JR) 0 15 mg/0 3 mL SOAJ Inject 0 3 mL (0 15 mg total) into a muscle once for 1 dose, Starting Mon 7/30/2018, Normal      etonogestrel (NEXPLANON) subdermal implant 68 mg by Subdermal route once, Historical Med           No discharge procedures on file      ED Provider  Electronically Signed by           Kenneth Ross PA-C  11/03/18 5213

## 2019-01-01 ENCOUNTER — HOSPITAL ENCOUNTER (EMERGENCY)
Facility: HOSPITAL | Age: 19
Discharge: HOME | End: 2019-01-01
Admitting: EMERGENCY MEDICINE
Payer: COMMERCIAL

## 2019-01-01 VITALS
TEMPERATURE: 98.5 F | HEIGHT: 67 IN | RESPIRATION RATE: 16 BRPM | DIASTOLIC BLOOD PRESSURE: 71 MMHG | HEART RATE: 66 BPM | WEIGHT: 172.19 LBS | BODY MASS INDEX: 27.02 KG/M2 | OXYGEN SATURATION: 100 % | SYSTOLIC BLOOD PRESSURE: 124 MMHG

## 2019-01-01 DIAGNOSIS — N94.6 MENSTRUAL CRAMPS: Primary | ICD-10-CM

## 2019-01-01 LAB
BACTERIA UR QL AUTO: ABNORMAL /HPF
BILIRUB UR QL STRIP: NEGATIVE
CLARITY UR: CLEAR
COLOR UR: ABNORMAL
EXT PREG TEST URINE: NEGATIVE
GLUCOSE UR STRIP-MCNC: NEGATIVE MG/DL
HGB UR QL STRIP.AUTO: 50
KETONES UR STRIP-MCNC: ABNORMAL MG/DL
LEUKOCYTE ESTERASE UR QL STRIP: NEGATIVE
MUCOUS THREADS UR QL AUTO: ABNORMAL
NITRITE UR QL STRIP: NEGATIVE
NON-SQ EPI CELLS URNS QL MICRO: ABNORMAL /HPF
PH UR STRIP.AUTO: 6 [PH] (ref 4.5–8)
PROT UR STRIP-MCNC: NEGATIVE MG/DL
RBC #/AREA URNS AUTO: ABNORMAL /HPF
SP GR UR STRIP.AUTO: 1.02 (ref 1–1.04)
UROBILINOGEN UA: NEGATIVE MG/DL
WBC #/AREA URNS AUTO: ABNORMAL /HPF

## 2019-01-01 PROCEDURE — 96372 THER/PROPH/DIAG INJ SC/IM: CPT

## 2019-01-01 PROCEDURE — 81025 URINE PREGNANCY TEST: CPT | Performed by: PHYSICIAN ASSISTANT

## 2019-01-01 PROCEDURE — 99284 EMERGENCY DEPT VISIT MOD MDM: CPT

## 2019-01-01 PROCEDURE — 81001 URINALYSIS AUTO W/SCOPE: CPT | Performed by: PHYSICIAN ASSISTANT

## 2019-01-01 RX ORDER — NAPROXEN 500 MG/1
500 TABLET ORAL 2 TIMES DAILY WITH MEALS
Qty: 10 TABLET | Refills: 0 | Status: SHIPPED | OUTPATIENT
Start: 2019-01-01 | End: 2019-03-11

## 2019-01-01 RX ORDER — KETOROLAC TROMETHAMINE 30 MG/ML
15 INJECTION, SOLUTION INTRAMUSCULAR; INTRAVENOUS ONCE
Status: COMPLETED | OUTPATIENT
Start: 2019-01-01 | End: 2019-01-01

## 2019-01-01 RX ADMIN — KETOROLAC TROMETHAMINE 15 MG: 30 INJECTION, SOLUTION INTRAMUSCULAR at 19:00

## 2019-01-01 NOTE — ED PROVIDER NOTES
History  Chief Complaint   Patient presents with    Abdominal Cramping     pt states she has abdominal crampign with her period  pt states she is passing some clots as well  pt states she has never experinced this type of pain iwth her period in past       This is a 39-year-old female patient whose menstrual cycle started 3 days ago  He presents because she is having more intense cramps than she is accustomed to  No headache blurred vision double vision no cough congestion sore throat nausea vomiting diarrhea or abdominal pain she points directly over her pelvic region center  However is not reproducible by touch  No vaginal discharge no urgency frequency or dysuria  Nothing makes it better or worse she tried nothing over-the-counter  She does have a implant for birth control that she was C5 months ago she does states she is passing clots but is not bleeding more than she normally does  Patient's abdomen is nonsurgical and not reproducible  She was given Toradol for cramps ibuprofen for outpatient and she is stable for discharge his pain is not radiate  No CVA tenderness            Prior to Admission Medications   Prescriptions Last Dose Informant Patient Reported? Taking? Albuterol Sulfate (VENTOLIN HFA IN)  Self Yes No   Sig: Inhale   EPINEPHrine (EPIPEN JR) 0 15 mg/0 3 mL SOAJ   No No   Sig: Inject 0 3 mL (0 15 mg total) into a muscle once for 1 dose   albuterol (2 5 mg/3 mL) 0 083 % nebulizer solution   No No   Sig: Take 1 vial (2 5 mg total) by nebulization every 6 (six) hours as needed for wheezing or shortness of breath   etonogestrel (NEXPLANON) subdermal implant   Yes No   Si mg by Subdermal route once   nitrofurantoin (MACROBID) 100 mg capsule   No No   Sig: Take 1 capsule (100 mg total) by mouth 2 (two) times a day      Facility-Administered Medications: None       Past Medical History:   Diagnosis Date    Allergic     Asthma     Eczema        History reviewed   No pertinent surgical history  Family History   Problem Relation Age of Onset    Family history unknown: Yes     I have reviewed and agree with the history as documented  Social History   Substance Use Topics    Smoking status: Never Smoker    Smokeless tobacco: Never Used    Alcohol use No        Review of Systems   All other systems reviewed and are negative  Physical Exam  Physical Exam   Constitutional: She appears well-developed and well-nourished  HENT:   Head: Normocephalic and atraumatic  Right Ear: External ear normal    Left Ear: External ear normal    Nose: Nose normal    Mouth/Throat: Oropharynx is clear and moist    Eyes: Pupils are equal, round, and reactive to light  Conjunctivae are normal    Neck: Normal range of motion  Neck supple  Cardiovascular: Normal rate and regular rhythm  Pulmonary/Chest: Effort normal and breath sounds normal    Abdominal: Soft  Bowel sounds are normal  There is no tenderness  Musculoskeletal: Normal range of motion  Neurological: She is alert  Skin: Skin is warm  Psychiatric: She has a normal mood and affect  Her behavior is normal    Nursing note and vitals reviewed        Vital Signs  ED Triage Vitals [01/01/19 1836]   Temperature Pulse Respirations Blood Pressure SpO2   98 5 °F (36 9 °C) 66 16 124/71 100 %      Temp Source Heart Rate Source Patient Position - Orthostatic VS BP Location FiO2 (%)   Tympanic Monitor Sitting Left arm --      Pain Score       9           Vitals:    01/01/19 1836   BP: 124/71   Pulse: 66   Patient Position - Orthostatic VS: Sitting       Visual Acuity      ED Medications  Medications   ketorolac (TORADOL) injection 15 mg (not administered)       Diagnostic Studies  Results Reviewed     Procedure Component Value Units Date/Time    POCT pregnancy, urine [[id number]]  (Normal) Resulted:  01/01/19 1855    Lab Status:  Final result Updated:  01/01/19 1857     EXT PREG TEST UR (Ref: Negative) NEGATIVE    UA w Reflex to Microscopic [[id number]] Collected:  01/01/19 1855    Lab Status:  No result Specimen:  Urine from Urine, Clean Catch                  No orders to display              Procedures  Procedures       Phone Contacts  ED Phone Contact    ED Course                               MDM  CritCare Time    Disposition  Final diagnoses:   Menstrual cramps     Time reflects when diagnosis was documented in both MDM as applicable and the Disposition within this note     Time User Action Codes Description Comment    1/1/2019  6:59 PM Simón Willard Add [N94 6] Menstrual cramps       ED Disposition     ED Disposition Condition Comment    Discharge  [address] [place] [address] discharge to home/self care  Condition at discharge: Good        Follow-up Information     Follow up With Specialties Details Why Contact Info    Camelia Rivera MD Internal Medicine Schedule an appointment as soon as possible for a visit  [address] [address]  [phone]            Patient's Medications   Discharge Prescriptions    NAPROXEN (EC NAPROSYN) 500 MG EC TABLET    Take 1 tablet (500 mg total) by mouth 2 (two) times a day with meals       Start Date: 1/1/2019  End Date: 1/1/2020       Order Dose: 500 mg       Quantity: 10 tablet    Refills: 0     No discharge procedures on file      ED Provider  Electronically Signed by           June Zhang PA-C  01/01/19 8666

## 2019-01-02 NOTE — DISCHARGE INSTRUCTIONS
Dysmenorrhea   WHAT YOU NEED TO KNOW:   Dysmenorrhea is painful menstrual cramps at or around the time of your monthly period  DISCHARGE INSTRUCTIONS:   Medicines: You may need any of the following:  · NSAIDs  help decrease swelling, pain, and fever  This medicine is available with or without a doctor's order  NSAIDs can cause stomach bleeding or kidney problems in certain people  If you take blood thinner medicine, always ask your healthcare provider if NSAIDs are safe for you  Always read the medicine label and follow directions  · Birth control medicine  may help decrease your pain  This medicine may be birth control pills or an IUD that does not contain copper  · Take your medicine as directed  Contact your healthcare provider if you think your medicine is not helping or if you have side effects  Tell him if you are allergic to any medicine  Keep a list of the medicines, vitamins, and herbs you take  Include the amounts, and when and why you take them  Bring the list or the pill bottles to follow-up visits  Carry your medicine list with you in case of an emergency  Eat low-fat foods: Increase the amount of vegetables and raw seeds you eat  Ask your healthcare provider if you should take vitamin B or magnesium supplements  These will help decrease your pain  Do not eat dairy products or eggs  Apply heat:  Apply heat on your lower abdomen for 20 to 30 minutes every 2 hours for as many days as directed  Heat helps decrease pain and muscle spasms  Manage your stress:  Stress can make your symptoms worse  Try relaxation exercises, such as deep breathing  Exercise regularly:  Ask your healthcare provider about the best exercise plan for you  Exercise can help decrease pain  Keep a record of your pain:  Write down when your pain and periods start and stop  Bring the record with you to your follow-up visits  Do not smoke:  Avoid others who smoke  If you smoke, it is never too late to quit   Smoking can increase your risk for dysmenorrhea  Ask your healthcare provider for information if you need help quitting  Follow up with your healthcare provider or OB-GYN as directed:  Write down your questions so you remember to ask them during your visits  Contact your healthcare provider or OB-GYN if:   · You have anxiety or feel depressed  · Your periods are early, late, or more painful than usual     · You have questions or concerns about your condition or care  Return to the emergency department if:   · You have severe pain  · You have heavy vaginal bleeding and you feel faint  · You have sudden chest pain and trouble breathing  © 2017 [facility] Information is for End User's use only and may not be sold, redistributed or otherwise used for commercial purposes  All illustrations and images included in CareNotes® are the copyrighted property of [employer]  or [employer]  The above information is an  only  It is not intended as medical advice for individual conditions or treatments  Talk to your doctor, nurse or pharmacist before following any medical regimen to see if it is safe and effective for you

## 2019-03-11 ENCOUNTER — APPOINTMENT (EMERGENCY)
Dept: RADIOLOGY | Facility: HOSPITAL | Age: 19
End: 2019-03-11
Payer: COMMERCIAL

## 2019-03-11 ENCOUNTER — HOSPITAL ENCOUNTER (OUTPATIENT)
Facility: HOSPITAL | Age: 19
Setting detail: OBSERVATION
Discharge: HOME | End: 2019-03-13
Attending: EMERGENCY MEDICINE | Admitting: FAMILY MEDICINE
Payer: COMMERCIAL

## 2019-03-11 ENCOUNTER — HOSPITAL ENCOUNTER (EMERGENCY)
Facility: HOSPITAL | Age: 19
Discharge: HOME | End: 2019-03-11
Attending: EMERGENCY MEDICINE | Admitting: EMERGENCY MEDICINE
Payer: COMMERCIAL

## 2019-03-11 VITALS
HEART RATE: 114 BPM | BODY MASS INDEX: 26.3 KG/M2 | SYSTOLIC BLOOD PRESSURE: 132 MMHG | OXYGEN SATURATION: 96 % | TEMPERATURE: 98.6 F | WEIGHT: 167.55 LBS | RESPIRATION RATE: 17 BRPM | HEIGHT: 67 IN | DIASTOLIC BLOOD PRESSURE: 78 MMHG

## 2019-03-11 DIAGNOSIS — J18.9 PNEUMONIA: ICD-10-CM

## 2019-03-11 DIAGNOSIS — J45.21 MILD INTERMITTENT ASTHMA WITH EXACERBATION: Primary | ICD-10-CM

## 2019-03-11 DIAGNOSIS — E87.6 HYPOKALEMIA: ICD-10-CM

## 2019-03-11 DIAGNOSIS — J45.901 ASTHMA EXACERBATION: Primary | ICD-10-CM

## 2019-03-11 DIAGNOSIS — J45.901 ACUTE ASTHMA EXACERBATION: ICD-10-CM

## 2019-03-11 PROBLEM — A41.9 SEPSIS (HCC): Status: ACTIVE | Noted: 2019-03-11

## 2019-03-11 LAB
ALBUMIN SERPL BCP-MCNC: 4.9 G/DL (ref 3–5.2)
ALP SERPL-CCNC: 55 U/L (ref 43–122)
ALT SERPL W P-5'-P-CCNC: 21 U/L (ref 9–52)
ANION GAP SERPL CALCULATED.3IONS-SCNC: 14 MMOL/L (ref 5–14)
APTT PPP: 24 SECONDS (ref 23–34)
AST SERPL W P-5'-P-CCNC: 26 U/L (ref 14–36)
BACTERIA UR QL AUTO: ABNORMAL /HPF
BASOPHILS # BLD AUTO: 0 THOUSANDS/ΜL (ref 0–0.1)
BASOPHILS NFR BLD AUTO: 0 % (ref 0–1)
BILIRUB SERPL-MCNC: 0.4 MG/DL
BILIRUB UR QL STRIP: NEGATIVE
BUN SERPL-MCNC: 6 MG/DL (ref 5–25)
CALCIUM SERPL-MCNC: 9.7 MG/DL (ref 8.4–10.2)
CHLORIDE SERPL-SCNC: 104 MMOL/L (ref 97–108)
CLARITY UR: CLEAR
CO2 SERPL-SCNC: 20 MMOL/L (ref 22–30)
COLOR UR: YELLOW
CREAT SERPL-MCNC: 0.54 MG/DL (ref 0.6–1.2)
EOSINOPHIL # BLD AUTO: 0.3 THOUSAND/ΜL (ref 0–0.4)
EOSINOPHIL NFR BLD AUTO: 4 % (ref 0–6)
ERYTHROCYTE [DISTWIDTH] IN BLOOD BY AUTOMATED COUNT: 13.4 %
EXT PREG TEST URINE: NEGATIVE
FLUAV + FLUBV RNA ISLT NAA+PROBE: NOT DETECTED
FLUAV + FLUBV RNA ISLT NAA+PROBE: NOT DETECTED
GFR SERPL CREATININE-BSD FRML MDRD: 135 ML/MIN/1.73SQ M
GLUCOSE SERPL-MCNC: 91 MG/DL (ref 70–99)
GLUCOSE UR STRIP-MCNC: NEGATIVE MG/DL
HCT VFR BLD AUTO: 42.5 % (ref 36–46)
HGB BLD-MCNC: 13.7 G/DL (ref 12–16)
HGB UR QL STRIP.AUTO: 10
INR PPP: 1.01 (ref 0.89–1.1)
KETONES UR STRIP-MCNC: NEGATIVE MG/DL
LACTATE SERPL-SCNC: 2.8 MMOL/L (ref 0.7–2)
LEUKOCYTE ESTERASE UR QL STRIP: NEGATIVE
LYMPHOCYTES # BLD AUTO: 0.6 THOUSANDS/ΜL (ref 0.5–4)
LYMPHOCYTES NFR BLD AUTO: 7 % (ref 25–45)
MCH RBC QN AUTO: 30.2 PG (ref 26–34)
MCHC RBC AUTO-ENTMCNC: 32.3 G/DL (ref 31–36)
MCV RBC AUTO: 94 FL (ref 80–100)
MONOCYTES # BLD AUTO: 0.4 THOUSAND/ΜL (ref 0.2–0.9)
MONOCYTES NFR BLD AUTO: 4 % (ref 1–10)
MUCOUS THREADS UR QL AUTO: ABNORMAL
NEUTROPHILS # BLD AUTO: 7.6 THOUSANDS/ΜL (ref 1.8–7.8)
NEUTS SEG NFR BLD AUTO: 85 % (ref 45–65)
NITRITE UR QL STRIP: NEGATIVE
NON-SQ EPI CELLS URNS QL MICRO: ABNORMAL /HPF
PH UR STRIP.AUTO: 7 [PH]
PLATELET # BLD AUTO: 259 THOUSANDS/UL (ref 150–450)
PMV BLD AUTO: 9.5 FL (ref 8.9–12.7)
POTASSIUM SERPL-SCNC: 3.1 MMOL/L (ref 3.6–5)
PROT SERPL-MCNC: 8.1 G/DL (ref 5.9–8.4)
PROT UR STRIP-MCNC: NEGATIVE MG/DL
PROTHROMBIN TIME: 10.7 SECONDS (ref 9.5–11.6)
RBC # BLD AUTO: 4.55 MILLION/UL (ref 4–5.2)
RBC #/AREA URNS AUTO: ABNORMAL /HPF
SODIUM SERPL-SCNC: 138 MMOL/L (ref 137–147)
SP GR UR STRIP.AUTO: 1.01 (ref 1–1.04)
UROBILINOGEN UA: NEGATIVE MG/DL
WBC # BLD AUTO: 9 THOUSAND/UL (ref 4.5–11)
WBC #/AREA URNS AUTO: ABNORMAL /HPF

## 2019-03-11 PROCEDURE — 85610 PROTHROMBIN TIME: CPT | Performed by: EMERGENCY MEDICINE

## 2019-03-11 PROCEDURE — 85025 COMPLETE CBC W/AUTO DIFF WBC: CPT | Performed by: EMERGENCY MEDICINE

## 2019-03-11 PROCEDURE — 85730 THROMBOPLASTIN TIME PARTIAL: CPT | Performed by: EMERGENCY MEDICINE

## 2019-03-11 PROCEDURE — 80053 COMPREHEN METABOLIC PANEL: CPT | Performed by: EMERGENCY MEDICINE

## 2019-03-11 PROCEDURE — 99283 EMERGENCY DEPT VISIT LOW MDM: CPT

## 2019-03-11 PROCEDURE — 81001 URINALYSIS AUTO W/SCOPE: CPT | Performed by: EMERGENCY MEDICINE

## 2019-03-11 PROCEDURE — 36415 COLL VENOUS BLD VENIPUNCTURE: CPT | Performed by: EMERGENCY MEDICINE

## 2019-03-11 PROCEDURE — 87502 INFLUENZA DNA AMP PROBE: CPT | Performed by: EMERGENCY MEDICINE

## 2019-03-11 PROCEDURE — 99285 EMERGENCY DEPT VISIT HI MDM: CPT

## 2019-03-11 PROCEDURE — 87040 BLOOD CULTURE FOR BACTERIA: CPT | Performed by: EMERGENCY MEDICINE

## 2019-03-11 PROCEDURE — 83605 ASSAY OF LACTIC ACID: CPT | Performed by: EMERGENCY MEDICINE

## 2019-03-11 PROCEDURE — 71046 X-RAY EXAM CHEST 2 VIEWS: CPT

## 2019-03-11 PROCEDURE — 99219 PR INITIAL OBSERVATION CARE/DAY 50 MINUTES: CPT | Performed by: PHYSICIAN ASSISTANT

## 2019-03-11 PROCEDURE — 96374 THER/PROPH/DIAG INJ IV PUSH: CPT

## 2019-03-11 PROCEDURE — 94640 AIRWAY INHALATION TREATMENT: CPT

## 2019-03-11 PROCEDURE — 81025 URINE PREGNANCY TEST: CPT | Performed by: EMERGENCY MEDICINE

## 2019-03-11 RX ORDER — ALBUTEROL SULFATE 90 UG/1
2 AEROSOL, METERED RESPIRATORY (INHALATION) EVERY 4 HOURS PRN
Qty: 1 INHALER | Refills: 0 | Status: SHIPPED | OUTPATIENT
Start: 2019-03-11 | End: 2020-05-18 | Stop reason: SDUPTHER

## 2019-03-11 RX ORDER — LEVOFLOXACIN 5 MG/ML
750 INJECTION, SOLUTION INTRAVENOUS ONCE
Status: COMPLETED | OUTPATIENT
Start: 2019-03-11 | End: 2019-03-11

## 2019-03-11 RX ORDER — POTASSIUM CHLORIDE 750 MG/1
40 TABLET, EXTENDED RELEASE ORAL ONCE
Status: COMPLETED | OUTPATIENT
Start: 2019-03-11 | End: 2019-03-11

## 2019-03-11 RX ORDER — ALBUTEROL SULFATE 2.5 MG/3ML
5 SOLUTION RESPIRATORY (INHALATION) ONCE
Status: COMPLETED | OUTPATIENT
Start: 2019-03-11 | End: 2019-03-11

## 2019-03-11 RX ORDER — MAGNESIUM SULFATE HEPTAHYDRATE 40 MG/ML
2 INJECTION, SOLUTION INTRAVENOUS ONCE
Status: COMPLETED | OUTPATIENT
Start: 2019-03-11 | End: 2019-03-11

## 2019-03-11 RX ORDER — ALBUTEROL SULFATE 2.5 MG/3ML
10 SOLUTION RESPIRATORY (INHALATION) ONCE
Status: COMPLETED | OUTPATIENT
Start: 2019-03-11 | End: 2019-03-11

## 2019-03-11 RX ORDER — ACETAMINOPHEN 325 MG/1
975 TABLET ORAL ONCE
Status: COMPLETED | OUTPATIENT
Start: 2019-03-11 | End: 2019-03-11

## 2019-03-11 RX ORDER — PREDNISONE 20 MG/1
60 TABLET ORAL DAILY
Qty: 15 TABLET | Refills: 0 | Status: SHIPPED | OUTPATIENT
Start: 2019-03-11 | End: 2019-03-13 | Stop reason: HOSPADM

## 2019-03-11 RX ADMIN — SODIUM CHLORIDE 1000 ML: 9 INJECTION, SOLUTION INTRAVENOUS at 23:13

## 2019-03-11 RX ADMIN — ALBUTEROL SULFATE 10 MG: 2.5 SOLUTION RESPIRATORY (INHALATION) at 15:12

## 2019-03-11 RX ADMIN — MAGNESIUM SULFATE IN WATER 2 G: 40 INJECTION, SOLUTION INTRAVENOUS at 22:57

## 2019-03-11 RX ADMIN — IPRATROPIUM BROMIDE 1 MG: 0.5 SOLUTION RESPIRATORY (INHALATION) at 21:36

## 2019-03-11 RX ADMIN — LEVOFLOXACIN 750 MG: 750 INJECTION, SOLUTION INTRAVENOUS at 23:08

## 2019-03-11 RX ADMIN — ALBUTEROL SULFATE 10 MG: 2.5 SOLUTION RESPIRATORY (INHALATION) at 21:37

## 2019-03-11 RX ADMIN — IPRATROPIUM BROMIDE 0.5 MG: 0.5 SOLUTION RESPIRATORY (INHALATION) at 15:13

## 2019-03-11 RX ADMIN — SODIUM CHLORIDE 1000 ML: 9 INJECTION, SOLUTION INTRAVENOUS at 22:57

## 2019-03-11 RX ADMIN — ALBUTEROL SULFATE 5 MG: 2.5 SOLUTION RESPIRATORY (INHALATION) at 20:47

## 2019-03-11 RX ADMIN — IPRATROPIUM BROMIDE 0.5 MG: 0.5 SOLUTION RESPIRATORY (INHALATION) at 20:47

## 2019-03-11 RX ADMIN — ACETAMINOPHEN 975 MG: 325 TABLET ORAL at 22:37

## 2019-03-11 RX ADMIN — POTASSIUM CHLORIDE 40 MEQ: 10 TABLET, EXTENDED RELEASE ORAL at 23:35

## 2019-03-11 NOTE — ED PROVIDER NOTES
History  Chief Complaint   Patient presents with    Asthma     pt states her asthma is acting up  pt states she used a nebulizer tx today with no relief  Patient is a 47-year-old female with a past medical history significant for asthma who has never been intubated for her asthma and has never been admitted to the ICU for her asthma and presents today with chief complaint of wheezing/cough  Patient reports she uses her albuterol pump 1-2 times per day over the past 3 days however does not normally use her albuterol pump as she does not typically have symptoms on a daily basis  Patient reports she only uses her albuterol when she does get sick  Patient reports increased nasal congestion, coughing, wheezing  Patient denies any abdominal pain, nausea, vomiting  Patient denies urinary symptoms at this time  Patient denies any chest pain however does report that at times with her coughing she does experience some shortness of breath  Patient reports she does have a nebulizer at home however reports she has not been using her nebulizer over the past few days  History provided by:  Patient  Asthma   Location:  Wheezing / lungs  Quality:  Wheezing  Severity:  Moderate  Onset quality:  Gradual  Duration:  3 days  Timing:  Constant  Progression:  Unchanged  Chronicity:  New  Context:  Increased nasal congestion / coughing  Relieved by: Albuterol MDI  Worsened by:  Cold air / coughing  Associated symptoms: congestion, cough, fatigue, rhinorrhea, shortness of breath and wheezing    Associated symptoms: no abdominal pain, no chest pain, no diarrhea, no ear pain, no fever, no nausea, no rash, no sore throat and no vomiting        Prior to Admission Medications   Prescriptions Last Dose Informant Patient Reported? Taking?    Albuterol Sulfate (VENTOLIN HFA IN)  Self Yes No   Sig: Inhale   EPINEPHrine (EPIPEN JR) 0 15 mg/0 3 mL SOAJ   No No   Sig: Inject 0 3 mL (0 15 mg total) into a muscle once for 1 dose albuterol (2 5 mg/3 mL) 0 083 % nebulizer solution   No No   Sig: Take 1 vial (2 5 mg total) by nebulization every 6 (six) hours as needed for wheezing or shortness of breath   etonogestrel (NEXPLANON) subdermal implant   Yes No   Si mg by Subdermal route once   naproxen (EC NAPROSYN) 500 MG EC tablet   No No   Sig: Take 1 tablet (500 mg total) by mouth 2 (two) times a day with meals   nitrofurantoin (MACROBID) 100 mg capsule   No No   Sig: Take 1 capsule (100 mg total) by mouth 2 (two) times a day      Facility-Administered Medications: None       Past Medical History:   Diagnosis Date    Allergic     Asthma     Eczema        History reviewed  No pertinent surgical history  Family History   Family history unknown: Yes     I have reviewed and agree with the history as documented  Social History     Tobacco Use    Smoking status: Never Smoker    Smokeless tobacco: Never Used   Substance Use Topics    Alcohol use: No    Drug use: No        Review of Systems   Constitutional: Positive for fatigue  Negative for chills and fever  HENT: Positive for congestion, rhinorrhea and sneezing  Negative for ear pain and sore throat  Eyes: Negative for redness  Respiratory: Positive for cough, shortness of breath and wheezing  Negative for chest tightness  Cardiovascular: Negative for chest pain and palpitations  Gastrointestinal: Negative for abdominal pain, constipation, diarrhea, nausea and vomiting  Genitourinary: Negative for dysuria and hematuria  Musculoskeletal: Negative  Negative for neck pain and neck stiffness  Skin: Negative for rash  Neurological: Negative for dizziness, syncope, light-headedness and numbness  Physical Exam  Physical Exam   Constitutional: She is oriented to person, place, and time  She appears well-developed and well-nourished  HENT:   Head: Normocephalic  Mouth/Throat: Oropharynx is clear and moist  No posterior oropharyngeal erythema   Tonsils are 0 on the right  Tonsils are 0 on the left  No tonsillar exudate  Eyes: No scleral icterus  Cardiovascular: Normal rate and regular rhythm  Pulmonary/Chest: Effort normal  No stridor  No respiratory distress  She has wheezes  Abdominal: Soft  She exhibits no distension  There is no tenderness  Musculoskeletal: Normal range of motion  Neurological: She is alert and oriented to person, place, and time  Skin: Skin is warm and dry  Capillary refill takes less than 2 seconds  Psychiatric: She has a normal mood and affect  Nursing note and vitals reviewed  Vital Signs  ED Triage Vitals [03/11/19 1443]   Temperature Pulse Respirations Blood Pressure SpO2   98 6 °F (37 °C) 102 20 119/67 95 %      Temp Source Heart Rate Source Patient Position - Orthostatic VS BP Location FiO2 (%)   Tympanic Monitor Sitting Left arm --      Pain Score       --           Vitals:    03/11/19 1443   BP: 119/67   Pulse: 102   Patient Position - Orthostatic VS: Sitting       Visual Acuity      ED Medications  Medications   albuterol inhalation solution 10 mg (10 mg Nebulization Given 3/11/19 1512)   ipratropium (ATROVENT) 0 02 % inhalation solution 0 5 mg (0 5 mg Nebulization Given 3/11/19 1513)       Diagnostic Studies  Results Reviewed     None                 No orders to display              Procedures  Procedures       Phone Contacts  ED Phone Contact    ED Course  ED Course as of Mar 11 1610   Mon Mar 11, 2019   1605 Patient was significantly decreased wheezing, patient reports significant improvement in symptoms and reports she feels comfortable for discharge at this time  Return to emergency department criteria was reviewed with the patient, patient verbalized understanding at this time                                    MDM    Disposition  Final diagnoses:   Mild intermittent asthma with exacerbation     Time reflects when diagnosis was documented in both MDM as applicable and the Disposition within this note     Time User Action Codes Description Comment    3/11/2019  4:08 PM Cici Esqueda Add [[address] 21] Mild intermittent asthma with exacerbation       ED Disposition     ED Disposition Condition Date/Time Comment    Discharge Stable Mon Mar 11, 2019  4:07 PM [place] [facility] discharge to home/self care  Follow-up Information     Follow up With Specialties Details Why Brenden Estrada MD Family Medicine Schedule an appointment as soon as possible for a visit   [address] [place] [address] [address]             Patient's Medications   Discharge Prescriptions    ALBUTEROL (5 MG/ML) 0 5 % NEBULIZER SOLUTION    Take 0 5 mL (2 5 mg total) by nebulization every 6 (six) hours as needed for wheezing       Start Date: 3/11/2019 End Date: --       Order Dose: 2 5 mg       Quantity: 20 mL    Refills: 0    ALBUTEROL (PROVENTIL HFA,VENTOLIN HFA) 90 MCG/ACT INHALER    Inhale 2 puffs every 4 (four) hours as needed for wheezing       Start Date: 3/11/2019 End Date: --       Order Dose: 2 puffs       Quantity: 1 Inhaler    Refills: 0    PREDNISONE 20 MG TABLET    Take 3 tablets (60 mg total) by mouth daily for 5 days       Start Date: 3/11/2019 End Date: 3/16/2019       Order Dose: 60 mg       Quantity: 15 tablet    Refills: 0     No discharge procedures on file      ED Provider  Electronically Signed by           Felix Major PA-C  03/11/19 9198

## 2019-03-12 PROBLEM — E87.6 HYPOKALEMIA: Status: ACTIVE | Noted: 2019-03-12

## 2019-03-12 LAB
ANION GAP SERPL CALCULATED.3IONS-SCNC: 10 MMOL/L (ref 5–14)
ATRIAL RATE: 92 BPM
BUN SERPL-MCNC: 7 MG/DL (ref 5–25)
CALCIUM SERPL-MCNC: 9.2 MG/DL (ref 8.4–10.2)
CHLORIDE SERPL-SCNC: 107 MMOL/L (ref 97–108)
CO2 SERPL-SCNC: 19 MMOL/L (ref 22–30)
CREAT SERPL-MCNC: 0.48 MG/DL (ref 0.6–1.2)
EOSINOPHIL # BLD AUTO: 0.08 THOUSAND/UL (ref 0–0.4)
EOSINOPHIL NFR BLD MANUAL: 1 % (ref 0–6)
ERYTHROCYTE [DISTWIDTH] IN BLOOD BY AUTOMATED COUNT: 13.2 %
GFR SERPL CREATININE-BSD FRML MDRD: 141 ML/MIN/1.73SQ M
GLUCOSE SERPL-MCNC: 112 MG/DL (ref 70–99)
HCT VFR BLD AUTO: 40.2 % (ref 36–46)
HGB BLD-MCNC: 13 G/DL (ref 12–16)
LACTATE SERPL-SCNC: 1.8 MMOL/L (ref 0.7–2)
LYMPHOCYTES # BLD AUTO: 0.73 THOUSAND/UL (ref 0.5–4)
LYMPHOCYTES # BLD AUTO: 9 % (ref 25–45)
MAGNESIUM SERPL-MCNC: 2.2 MG/DL (ref 1.6–2.3)
MCH RBC QN AUTO: 30.3 PG (ref 26–34)
MCHC RBC AUTO-ENTMCNC: 32.5 G/DL (ref 31–36)
MCV RBC AUTO: 93 FL (ref 80–100)
MONOCYTES # BLD AUTO: 0.08 THOUSAND/UL (ref 0.2–0.9)
MONOCYTES NFR BLD AUTO: 1 % (ref 1–10)
NEUTS BAND NFR BLD MANUAL: 2 % (ref 0–8)
NEUTS SEG # BLD: 7.21 THOUSAND/UL (ref 1.8–7.8)
NEUTS SEG NFR BLD AUTO: 87 %
P AXIS: 64 DEGREES
PLATELET # BLD AUTO: 254 THOUSANDS/UL (ref 150–450)
PLATELET BLD QL SMEAR: ADEQUATE
PMV BLD AUTO: 10.1 FL (ref 8.9–12.7)
POTASSIUM SERPL-SCNC: 3.7 MMOL/L (ref 3.6–5)
PR INTERVAL: 166 MS
PROCALCITONIN SERPL-MCNC: <0.05 NG/ML
QRS AXIS: 32 DEGREES
QRSD INTERVAL: 84 MS
QT INTERVAL: 372 MS
QTC INTERVAL: 460 MS
RBC # BLD AUTO: 4.3 MILLION/UL (ref 4–5.2)
RBC MORPH BLD: NORMAL
SODIUM SERPL-SCNC: 136 MMOL/L (ref 137–147)
T WAVE AXIS: 14 DEGREES
TOTAL CELLS COUNTED SPEC: 100
VENTRICULAR RATE: 92 BPM
WBC # BLD AUTO: 8.1 THOUSAND/UL (ref 4.5–11)

## 2019-03-12 PROCEDURE — 83735 ASSAY OF MAGNESIUM: CPT | Performed by: PHYSICIAN ASSISTANT

## 2019-03-12 PROCEDURE — 94760 N-INVAS EAR/PLS OXIMETRY 1: CPT

## 2019-03-12 PROCEDURE — 93005 ELECTROCARDIOGRAM TRACING: CPT

## 2019-03-12 PROCEDURE — 80048 BASIC METABOLIC PNL TOTAL CA: CPT | Performed by: PHYSICIAN ASSISTANT

## 2019-03-12 PROCEDURE — 94664 DEMO&/EVAL PT USE INHALER: CPT

## 2019-03-12 PROCEDURE — 85007 BL SMEAR W/DIFF WBC COUNT: CPT | Performed by: PHYSICIAN ASSISTANT

## 2019-03-12 PROCEDURE — 93010 ELECTROCARDIOGRAM REPORT: CPT | Performed by: INTERNAL MEDICINE

## 2019-03-12 PROCEDURE — 85027 COMPLETE CBC AUTOMATED: CPT | Performed by: PHYSICIAN ASSISTANT

## 2019-03-12 PROCEDURE — 94640 AIRWAY INHALATION TREATMENT: CPT

## 2019-03-12 PROCEDURE — 84145 PROCALCITONIN (PCT): CPT | Performed by: PHYSICIAN ASSISTANT

## 2019-03-12 PROCEDURE — 99225 PR SBSQ OBSERVATION CARE/DAY 25 MINUTES: CPT | Performed by: INTERNAL MEDICINE

## 2019-03-12 PROCEDURE — 83605 ASSAY OF LACTIC ACID: CPT | Performed by: PHYSICIAN ASSISTANT

## 2019-03-12 RX ORDER — SODIUM CHLORIDE FOR INHALATION 0.9 %
3 VIAL, NEBULIZER (ML) INHALATION
Status: DISCONTINUED | OUTPATIENT
Start: 2019-03-12 | End: 2019-03-12

## 2019-03-12 RX ORDER — METHYLPREDNISOLONE SODIUM SUCCINATE 40 MG/ML
40 INJECTION, POWDER, LYOPHILIZED, FOR SOLUTION INTRAMUSCULAR; INTRAVENOUS EVERY 8 HOURS SCHEDULED
Status: DISCONTINUED | OUTPATIENT
Start: 2019-03-12 | End: 2019-03-12

## 2019-03-12 RX ORDER — ACETAMINOPHEN 325 MG/1
650 TABLET ORAL EVERY 6 HOURS PRN
Status: DISCONTINUED | OUTPATIENT
Start: 2019-03-12 | End: 2019-03-13 | Stop reason: HOSPADM

## 2019-03-12 RX ORDER — SODIUM CHLORIDE FOR INHALATION 0.9 %
3 VIAL, NEBULIZER (ML) INHALATION
Status: DISCONTINUED | OUTPATIENT
Start: 2019-03-12 | End: 2019-03-13

## 2019-03-12 RX ORDER — LEVALBUTEROL 1.25 MG/.5ML
1.25 SOLUTION, CONCENTRATE RESPIRATORY (INHALATION)
Status: DISCONTINUED | OUTPATIENT
Start: 2019-03-12 | End: 2019-03-12

## 2019-03-12 RX ORDER — GUAIFENESIN 600 MG
600 TABLET, EXTENDED RELEASE 12 HR ORAL 2 TIMES DAILY
Status: DISCONTINUED | OUTPATIENT
Start: 2019-03-12 | End: 2019-03-13 | Stop reason: HOSPADM

## 2019-03-12 RX ORDER — ALBUTEROL SULFATE 90 UG/1
2 AEROSOL, METERED RESPIRATORY (INHALATION) EVERY 4 HOURS PRN
Status: DISCONTINUED | OUTPATIENT
Start: 2019-03-12 | End: 2019-03-13 | Stop reason: HOSPADM

## 2019-03-12 RX ORDER — LEVALBUTEROL 1.25 MG/.5ML
1.25 SOLUTION, CONCENTRATE RESPIRATORY (INHALATION)
Status: DISCONTINUED | OUTPATIENT
Start: 2019-03-12 | End: 2019-03-13 | Stop reason: HOSPADM

## 2019-03-12 RX ORDER — ONDANSETRON 2 MG/ML
4 INJECTION INTRAMUSCULAR; INTRAVENOUS EVERY 6 HOURS PRN
Status: DISCONTINUED | OUTPATIENT
Start: 2019-03-12 | End: 2019-03-13 | Stop reason: HOSPADM

## 2019-03-12 RX ORDER — SODIUM CHLORIDE 9 MG/ML
100 INJECTION, SOLUTION INTRAVENOUS CONTINUOUS
Status: DISCONTINUED | OUTPATIENT
Start: 2019-03-12 | End: 2019-03-13

## 2019-03-12 RX ORDER — METHYLPREDNISOLONE SODIUM SUCCINATE 125 MG/2ML
125 INJECTION, POWDER, LYOPHILIZED, FOR SOLUTION INTRAMUSCULAR; INTRAVENOUS ONCE
Status: COMPLETED | OUTPATIENT
Start: 2019-03-12 | End: 2019-03-12

## 2019-03-12 RX ORDER — BENZONATATE 100 MG/1
100 CAPSULE ORAL 3 TIMES DAILY PRN
Status: DISCONTINUED | OUTPATIENT
Start: 2019-03-12 | End: 2019-03-13 | Stop reason: HOSPADM

## 2019-03-12 RX ORDER — METHYLPREDNISOLONE SODIUM SUCCINATE 40 MG/ML
40 INJECTION, POWDER, LYOPHILIZED, FOR SOLUTION INTRAMUSCULAR; INTRAVENOUS EVERY 8 HOURS SCHEDULED
Status: DISCONTINUED | OUTPATIENT
Start: 2019-03-12 | End: 2019-03-13

## 2019-03-12 RX ADMIN — BENZONATATE 100 MG: 100 CAPSULE ORAL at 12:17

## 2019-03-12 RX ADMIN — LEVALBUTEROL HYDROCHLORIDE 1.25 MG: 1.25 SOLUTION, CONCENTRATE RESPIRATORY (INHALATION) at 07:34

## 2019-03-12 RX ADMIN — LEVALBUTEROL HYDROCHLORIDE 1.25 MG: 1.25 SOLUTION, CONCENTRATE RESPIRATORY (INHALATION) at 20:51

## 2019-03-12 RX ADMIN — BENZONATATE 100 MG: 100 CAPSULE ORAL at 00:55

## 2019-03-12 RX ADMIN — METHYLPREDNISOLONE SODIUM SUCCINATE 40 MG: 40 INJECTION, POWDER, FOR SOLUTION INTRAMUSCULAR; INTRAVENOUS at 13:43

## 2019-03-12 RX ADMIN — GUAIFENESIN 600 MG: 600 TABLET, EXTENDED RELEASE ORAL at 17:00

## 2019-03-12 RX ADMIN — METHYLPREDNISOLONE SODIUM SUCCINATE 40 MG: 40 INJECTION, POWDER, FOR SOLUTION INTRAMUSCULAR; INTRAVENOUS at 08:18

## 2019-03-12 RX ADMIN — ISODIUM CHLORIDE 3 ML: 0.03 SOLUTION RESPIRATORY (INHALATION) at 13:44

## 2019-03-12 RX ADMIN — LEVALBUTEROL HYDROCHLORIDE 1.25 MG: 1.25 SOLUTION, CONCENTRATE RESPIRATORY (INHALATION) at 13:45

## 2019-03-12 RX ADMIN — ISODIUM CHLORIDE 3 ML: 0.03 SOLUTION RESPIRATORY (INHALATION) at 07:34

## 2019-03-12 RX ADMIN — METHYLPREDNISOLONE SODIUM SUCCINATE 40 MG: 40 INJECTION, POWDER, FOR SOLUTION INTRAMUSCULAR; INTRAVENOUS at 21:45

## 2019-03-12 RX ADMIN — ISODIUM CHLORIDE 3 ML: 0.03 SOLUTION RESPIRATORY (INHALATION) at 20:51

## 2019-03-12 RX ADMIN — ONDANSETRON HYDROCHLORIDE 4 MG: 2 INJECTION, SOLUTION INTRAMUSCULAR; INTRAVENOUS at 01:08

## 2019-03-12 RX ADMIN — GUAIFENESIN 600 MG: 600 TABLET, EXTENDED RELEASE ORAL at 08:18

## 2019-03-12 RX ADMIN — METHYLPREDNISOLONE SODIUM SUCCINATE 125 MG: 125 INJECTION, POWDER, FOR SOLUTION INTRAMUSCULAR; INTRAVENOUS at 00:55

## 2019-03-12 RX ADMIN — SODIUM CHLORIDE 100 ML/HR: 9 INJECTION, SOLUTION INTRAVENOUS at 01:09

## 2019-03-12 NOTE — SOCIAL WORK
Pt under observation for asthma exacerbation  SW met with pt, sig other, and grandparents to complete notice and assessment  Notice signed with original placed in scan bin  Pt reports that she lives with her sig other and works FT in a warehouse  She is fully independent with ADLS and drives self  Pt notes that only DME she owns is a nebulizer  PCP is Dr Jen Sacks and preferred pharmacy is [facility] on [address]  When medically cleared for discharge her sig other can provide transport home  SW will continue to follow for plan of care

## 2019-03-12 NOTE — RESPIRATORY THERAPY NOTE
RT Protocol Note  Reddy Mace 22 y o  female MRN: [id number]  Unit/Bed#:  Encounter: [id number]    Assessment    Principal Problem:    Sepsis ([address] )  Active Problems:    Acute asthma exacerbation    Hypokalemia      Home Pulmonary Medications:  Prn albuterol       Past Medical History:   Diagnosis Date    Allergic     Asthma     Eczema      Social History     Socioeconomic History    Marital status: Single     Spouse name: None    Number of children: None    Years of education: None    Highest education level: None   Occupational History    None   Social Needs    Financial resource strain: None    Food insecurity:     Worry: None     Inability: None    Transportation needs:     Medical: None     Non-medical: None   Tobacco Use    Smoking status: Never Smoker    Smokeless tobacco: Never Used   Substance and Sexual Activity    Alcohol use: Never     Frequency: Never     Drinks per session: Patient refused     Binge frequency: Never    Drug use: No    Sexual activity: Yes     Birth control/protection: Implant   Lifestyle    Physical activity:     Days per week: None     Minutes per session: None    Stress: None   Relationships    Social connections:     Talks on phone: None     Gets together: None     Attends Yazidism service: None     Active member of club or organization: None     Attends meetings of clubs or organizations: None     Relationship status: None    Intimate partner violence:     Fear of current or ex partner: None     Emotionally abused: None     Physically abused: None     Forced sexual activity: None   Other Topics Concern    None   Social History Narrative    None       Subjective     States breathing is better    Objective  No respiratory distress noted  Physical Exam:   Assessment Type: During-treatment  General Appearance: Alert, Awake  Respiratory Pattern: Normal  Chest Assessment: Chest expansion symmetrical  Bilateral Breath Sounds: Expiratory wheezes, Inspiratory wheezes  Cough: Productive    Vitals:  Blood pressure 118/74, pulse 85, temperature 98 9 °F (37 2 °C), temperature source Temporal, resp  rate 22, height 5' 7" (1 702 m), weight 74 6 kg (164 lb 7 4 oz), last menstrual period 03/07/2019, SpO2 93 %, not currently breastfeeding  Imaging and other studies: I have personally reviewed pertinent reports              Plan    Respiratory Plan: Mild Distress pathway

## 2019-03-12 NOTE — NURSING NOTE
Pt  Tolerated breakfast tray with no n/v  Family members at bedside  Head-to-toe assessment completed and documented  Noted to be 87% on RA, Placed on NC 2L and maintaining 93-95%  All needs left within reach  Will continue to monitor

## 2019-03-12 NOTE — ASSESSMENT & PLAN NOTE
· Patient was initially seen in the ED earlier today for mild exacerbation; was discharged home with prednisone taper and used her nebulizer without relief  States she did not start taking the prednisone yet  · Febrile, tachypneic and tachycardic on arrival to ED  SpO2 95% on RA  · States she does feel improved after nebulizer treatments  However has been rather tachycardic after albuterol, will switch to DuoNebs with levalbuterol for future treatments  · Since patient did not take prednisone yet today, will give loading dose of 125 mg Solu-Medrol  Continue IV steroids 40 mg Q 8 hours for now  · Acute exacerbation likely in setting of viral URI    Check procalcitonin, hold off on further antibiotics for now  · Influenza negative

## 2019-03-12 NOTE — PLAN OF CARE
Problem: PAIN - ADULT  Goal: Verbalizes/displays adequate comfort level or baseline comfort level  Description  Interventions:  - Encourage patient to monitor pain and request assistance  - Assess pain using appropriate pain scale  - Administer analgesics based on type and severity of pain and evaluate response  - Implement non-pharmacological measures as appropriate and evaluate response  - Consider cultural and social influences on pain and pain management  - Notify physician/advanced practitioner if interventions unsuccessful or patient reports new pain  Outcome: Progressing     Problem: INFECTION - ADULT  Goal: Absence or prevention of progression during hospitalization  Description  INTERVENTIONS:  - Assess and monitor for signs and symptoms of infection  - Monitor lab/diagnostic results  - Monitor all insertion sites, i e  indwelling lines, tubes, and drains  - Monitor endotracheal (as able) and nasal secretions for changes in amount and color  - [place] appropriate cooling/warming therapies per order  - Administer medications as ordered  - Instruct and encourage patient and family to use good hand hygiene technique  - Identify and instruct in appropriate isolation precautions for identified infection/condition  Outcome: Progressing     Problem: SAFETY ADULT  Goal: Patient will remain free of falls  Description  INTERVENTIONS:  - Assess patient frequently for physical needs  -  Identify cognitive and physical deficits and behaviors that affect risk of falls    -  [place] fall precautions as indicated by assessment   - Educate patient/family on patient safety including physical limitations  - Instruct patient to call for assistance with activity based on assessment  - Modify environment to reduce risk of injury  - Consider OT/PT consult to assist with strengthening/mobility  Outcome: Progressing  Goal: Maintain or return to baseline ADL function  Description  INTERVENTIONS:  -  Assess patient's ability to carry out ADLs; assess patient's baseline for ADL function and identify physical deficits which impact ability to perform ADLs (bathing, care of mouth/teeth, toileting, grooming, dressing, etc )  - Assess/evaluate cause of self-care deficits   - Assess range of motion  - Assess patient's mobility; develop plan if impaired  - Assess patient's need for assistive devices and provide as appropriate  - Encourage maximum independence but intervene and supervise when necessary  ¯ Involve family in performance of ADLs  ¯ Assess for home care needs following discharge   ¯ Request OT consult to assist with ADL evaluation and planning for discharge  ¯ Provide patient education as appropriate  Outcome: Progressing  Goal: Maintain or return mobility status to optimal level  Description  INTERVENTIONS:  - Assess patient's baseline mobility status (ambulation, transfers, stairs, etc )    - Identify cognitive and physical deficits and behaviors that affect mobility  - Identify mobility aids required to assist with transfers and/or ambulation (gait belt, sit-to-stand, lift, walker, cane, etc )  - [place] fall precautions as indicated by assessment  - Record patient progress and toleration of activity level on Mobility SBAR; progress patient to next Phase/Stage  - Instruct patient to call for assistance with activity based on assessment  - Request Rehabilitation consult to assist with strengthening/weightbearing, etc   Outcome: Progressing     Problem: DISCHARGE PLANNING  Goal: Discharge to home or other facility with appropriate resources  Description  INTERVENTIONS:  - Identify barriers to discharge w/patient and caregiver  - Arrange for needed discharge resources and transportation as appropriate  - Identify discharge learning needs (meds, wound care, etc )  - Arrange for interpretive services to assist at discharge as needed  - Refer to Case Management Department for coordinating discharge planning if the patient needs post-hospital services based on physician/advanced practitioner order or complex needs related to functional status, cognitive ability, or social support system  Outcome: Progressing     Problem: Knowledge Deficit  Goal: Patient/family/caregiver demonstrates understanding of disease process, treatment plan, medications, and discharge instructions  Description  Complete learning assessment and assess knowledge base    Interventions:  - Provide teaching at level of understanding  - Provide teaching via preferred learning methods  Outcome: Progressing

## 2019-03-12 NOTE — PLAN OF CARE
Problem: PAIN - ADULT  Goal: Verbalizes/displays adequate comfort level or baseline comfort level  Description  Interventions:  - Encourage patient to monitor pain and request assistance  - Assess pain using appropriate pain scale  - Administer analgesics based on type and severity of pain and evaluate response  - Implement non-pharmacological measures as appropriate and evaluate response  - Consider cultural and social influences on pain and pain management  - Notify physician/advanced practitioner if interventions unsuccessful or patient reports new pain  Outcome: Progressing     Problem: INFECTION - ADULT  Goal: Absence or prevention of progression during hospitalization  Description  INTERVENTIONS:  - Assess and monitor for signs and symptoms of infection  - Monitor lab/diagnostic results  - Monitor all insertion sites, i e  indwelling lines, tubes, and drains  - Monitor endotracheal (as able) and nasal secretions for changes in amount and color  - [place] appropriate cooling/warming therapies per order  - Administer medications as ordered  - Instruct and encourage patient and family to use good hand hygiene technique  - Identify and instruct in appropriate isolation precautions for identified infection/condition  Outcome: Progressing     Problem: SAFETY ADULT  Goal: Patient will remain free of falls  Description  INTERVENTIONS:  - Assess patient frequently for physical needs  -  Identify cognitive and physical deficits and behaviors that affect risk of falls    -  [place] fall precautions as indicated by assessment   - Educate patient/family on patient safety including physical limitations  - Instruct patient to call for assistance with activity based on assessment  - Modify environment to reduce risk of injury  - Consider OT/PT consult to assist with strengthening/mobility  Outcome: Progressing  Goal: Maintain or return to baseline ADL function  Description  INTERVENTIONS:  -  Assess patient's ability to carry out ADLs; assess patient's baseline for ADL function and identify physical deficits which impact ability to perform ADLs (bathing, care of mouth/teeth, toileting, grooming, dressing, etc )  - Assess/evaluate cause of self-care deficits   - Assess range of motion  - Assess patient's mobility; develop plan if impaired  - Assess patient's need for assistive devices and provide as appropriate  - Encourage maximum independence but intervene and supervise when necessary  ¯ Involve family in performance of ADLs  ¯ Assess for home care needs following discharge   ¯ Request OT consult to assist with ADL evaluation and planning for discharge  ¯ Provide patient education as appropriate  Outcome: Progressing  Goal: Maintain or return mobility status to optimal level  Description  INTERVENTIONS:  - Assess patient's baseline mobility status (ambulation, transfers, stairs, etc )    - Identify cognitive and physical deficits and behaviors that affect mobility  - Identify mobility aids required to assist with transfers and/or ambulation (gait belt, sit-to-stand, lift, walker, cane, etc )  - [place] fall precautions as indicated by assessment  - Record patient progress and toleration of activity level on Mobility SBAR; progress patient to next Phase/Stage  - Instruct patient to call for assistance with activity based on assessment  - Request Rehabilitation consult to assist with strengthening/weightbearing, etc   Outcome: Progressing     Problem: DISCHARGE PLANNING  Goal: Discharge to home or other facility with appropriate resources  Description  INTERVENTIONS:  - Identify barriers to discharge w/patient and caregiver  - Arrange for needed discharge resources and transportation as appropriate  - Identify discharge learning needs (meds, wound care, etc )  - Arrange for interpretive services to assist at discharge as needed  - Refer to Case Management Department for coordinating discharge planning if the patient needs post-hospital services based on physician/advanced practitioner order or complex needs related to functional status, cognitive ability, or social support system  Outcome: Progressing     Problem: Knowledge Deficit  Goal: Patient/family/caregiver demonstrates understanding of disease process, treatment plan, medications, and discharge instructions  Description  Complete learning assessment and assess knowledge base    Interventions:  - Provide teaching at level of understanding  - Provide teaching via preferred learning methods  Outcome: Progressing

## 2019-03-12 NOTE — PROGRESS NOTES
VTE Pharmacologic Prophylaxis:   Pharmacologic: Pharmacologic VTE Prophylaxis contraindicated due to Patient is active bed in chair ambulation  Mechanical VTE Prophylaxis in Place: No    Patient Centered Rounds: I have performed bedside rounds with nursing staff today  Discussions with Specialists or Other Care Team Provider:  None    Education and Discussions with Family / Patient:  Patient    Time Spent for Care: 30 minutes  More than 50% of total time spent on counseling and coordination of care as described above  Current Length of Stay: 0 day(s)    Current Patient Status: Observation   Certification Statement: The patient will continue to require additional inpatient hospital stay due to Acute exacerbation of asthma    Discharge Plan:  Next 24 hour    Code Status: Level 1 - Full Code      Subjective:   Feeling better but is still tight and coughing bringing up whitish phlegm no chills no fever denied any sick contacts work at [employer] where is very [employer] certainly became short of breath got a asthma attack    Objective:     Vitals:   Temp (24hrs), Av 6 °F (37 6 °C), Min:98 6 °F (37 °C), Max:101 7 °F (38 7 °C)    Temp:  [98 6 °F (37 °C)-101 7 °F (38 7 °C)] 98 7 °F (37 1 °C)  HR:  [] 80  Resp:  [17-28] 22  BP: ()/(53-78) 99/53  SpO2:  [90 %-96 %] 92 %  Body mass index is 25 76 kg/m²  Input and Output Summary (last 24 hours): Intake/Output Summary (Last 24 hours) at 3/12/2019 0826  Last data filed at 3/12/2019 0600  Gross per 24 hour   Intake 535 ml   Output 775 ml   Net -240 ml       Physical Exam:     Physical Exam   Constitutional: She is oriented to person, place, and time  She appears well-developed and well-nourished  HENT:   Head: Normocephalic and atraumatic  Right Ear: External ear normal    Left Ear: External ear normal    Mouth/Throat: Oropharynx is clear and moist    Eyes: Pupils are equal, round, and reactive to light   Conjunctivae and EOM are normal  Neck: Normal range of motion  Neck supple  Cardiovascular: Normal rate, regular rhythm, normal heart sounds and intact distal pulses  Pulmonary/Chest: Effort normal  She has wheezes  Abdominal: Soft  Bowel sounds are normal  She exhibits no mass  There is no tenderness  There is no rebound and no guarding  Genitourinary:   Genitourinary Comments: deferred   Musculoskeletal: Normal range of motion  Neurological: She is alert and oriented to person, place, and time  She has normal reflexes  Skin: Skin is warm and dry  No rash noted  Psychiatric: She has a normal mood and affect  Nursing note and vitals reviewed  Additional Data:     Labs:    Results from last 7 days   Lab Units 03/12/19  0501 03/11/19  2245   WBC Thousand/uL 8 10 9 00   HEMOGLOBIN g/dL 13 0 13 7   HEMATOCRIT % 40 2 42 5   PLATELETS Thousands/uL 254 259   BANDS PCT % 2  --    NEUTROS PCT %  --  85*   LYMPHS PCT %  --  7*   LYMPHO PCT % 9*  --    MONOS PCT %  --  4   MONO PCT % 1  --    EOS PCT % 1 4     Results from last 7 days   Lab Units 03/12/19  0501 03/11/19  2245   SODIUM mmol/L 136* 138   POTASSIUM mmol/L 3 7 3 1*   CHLORIDE mmol/L 107 104   CO2 mmol/L 19* 20*   BUN mg/dL 7 6   CREATININE mg/dL 0 48* 0 54*   ANION GAP mmol/L 10 14   CALCIUM mg/dL 9 2 9 7   ALBUMIN g/dL  --  4 9   TOTAL BILIRUBIN mg/dL  --  0 40   ALK PHOS U/L  --  55   ALT U/L  --  21   AST U/L  --  26   GLUCOSE RANDOM mg/dL 112* 91     Results from last 7 days   Lab Units 03/11/19  2245   INR  1 01             Results from last 7 days   Lab Units 03/12/19  0143 03/11/19  2245   LACTIC ACID mmol/L 1 8 2 8*           * I Have Reviewed All Lab Data Listed Above  * Additional Pertinent Lab Tests Reviewed:  Alberto 66 Admission Reviewed    Imaging:    Imaging Reports Reviewed Today Include:  None today  Imaging Personally Reviewed by Myself Includes:  Result pending    Recent Cultures (last 7 days):           Last 24 Hours Medication List: Current Facility-Administered Medications:  acetaminophen 650 mg Oral Q6H PRN Sánchez Byrd PA-C    albuterol 2 puff Inhalation Q4H PRN Sánchez Byrd PA-C    benzonatate 100 mg Oral TID PRN Sánchez Byrd PA-C    guaiFENesin 600 mg Oral BID Gloris Schlatter Gyarmaty, PA-NITHYA    sodium chloride 3 mL Nebulization TID Lanre Poe MD    And        levalbuterol 1 25 mg Nebulization TID Lanre Poe MD    methylPREDNISolone sodium succinate 40 mg Intravenous [facility] Gloris Schlatter Gyarmaty, PA-C    ondansetron 4 mg Intravenous Q6H PRN Gloris Schlatter Gyarmaty, PA-C    sodium chloride 100 mL/hr Intravenous Continuous Sánchez Byrd PA-C Last Rate: 100 mL/hr (03/12/19 0109)        Today, Patient Was Seen By: Carlos العراقي MD    ** Please Note: Dictation voice to text software may have been used in the creation of this document  **        Progress Note - Cathy Pay 2000, 22 y o  female MRN: [id number]    Unit/Bed#:  Encounter: [id number]    Primary Care Provider: Jocelyn Bolaños MD   Date and time admitted to hospital: 3/11/2019  8:39 PM        Hypokalemia  Assessment & Plan  · Potassium noted to be 3 1 at time of admission  · Had been repleted with 40 mEq in the ED  · Re-check BMP in AM as well as magnesium  · Electrolyte normal    Acute asthma exacerbation  Assessment & Plan  · Patient was initially seen in the ED earlier today for mild exacerbation; was discharged home with prednisone taper and used her nebulizer without relief  States she did not start taking the prednisone yet  · Febrile, tachypneic and tachycardic on arrival to ED  SpO2 95% on RA  · States she does feel improved after nebulizer treatments  However has been rather tachycardic after albuterol, will switch to DuoNebs with levalbuterol for future treatments  · Since patient did not take prednisone yet today, will give loading dose of 125 mg Solu-Medrol    Continue IV steroids 40 mg Q 8 hours for now  · Acute exacerbation likely in setting of viral URI  Check procalcitonin, hold off on further antibiotics for now  · Influenza negative  · Afebrile tachycardia improved  · Lungs is still wheezing continue bronchodilator  · Steroid  · Hold off to antibiotic most probably precipitated by 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    * Sepsis [facility])  Assessment & Plan  · Patient had presented to the emergency department earlier this afternoon for mild asthma exacerbation; patient was discharged at that time however returned a couple of hours later with increasing shortness of breath and wheezing  Patient reports generalized URI symptoms prior to asthma exacerbation  · Was found to be febrile with fever of 101 7, tachypneic, tachycardic  - s/p nebulizer treatment at home  · CXR:  ED was concerned for possible right lower lobe pneumonia, however do not appreciate a focal consolidation - compared to prior normal studies  F/u final read  · Influenza negative; possibly due to other viral syndrome  Will check respiratory pathogen PCR  · CBC without leukocytosis  CMP with hypokalemia of 3 1; was repleted with 40 mEq in the ED    Recheck in AM as well as magnesium  · UA negative  · Repeat lactic acid normal  · Procalcitonin pending  · Afebrile no tachycardia white count normal blood cultures negative urine is pending  · Clinically patient is improving continue to watch no further antibiotic  · X-rays pending result

## 2019-03-12 NOTE — H&P
H&P- Reddy Mace 2000, 22 y o  female MRN: [id number]    Unit/Bed#:  Encounter: [id number]    Primary Care Provider: Brown Miller MD   Date and time admitted to hospital: 3/11/2019  8:39 PM    * Sepsis [facility])  Assessment & Plan  · Patient had presented to the emergency department earlier this afternoon for mild asthma exacerbation; patient was discharged at that time however returned a couple of hours later with increasing shortness of breath and wheezing  Patient reports generalized URI symptoms prior to asthma exacerbation  · Was found to be febrile with fever of 101 7, tachypneic, tachycardic  - s/p nebulizer treatment at home  · CXR:  ED was concerned for possible right lower lobe pneumonia, however do not appreciate a focal consolidation - compared to prior normal studies  F/u final read  · Influenza negative; possibly due to other viral syndrome  Will check respiratory pathogen PCR  · CBC without leukocytosis  CMP with hypokalemia of 3 1; was repleted with 40 mEq in the ED  Recheck in AM as well as magnesium  · UA negative  · Lactic acid noted to be elevated at 2 8  Will recheck after fluid administration  · Received 2 L NSS bolus total in the ED  · BC x2 pending  · Received dose of levaquin in the ED  Will hold off on further antibiotics for now, check procalcitonin    Hypokalemia  Assessment & Plan  · Potassium noted to be 3 1 at time of admission  · Had been repleted with 40 mEq in the ED  · Re-check BMP in AM as well as magnesium    Acute asthma exacerbation  Assessment & Plan  · Patient was initially seen in the ED earlier today for mild exacerbation; was discharged home with prednisone taper and used her nebulizer without relief  States she did not start taking the prednisone yet  · Febrile, tachypneic and tachycardic on arrival to ED  SpO2 95% on RA  · States she does feel improved after nebulizer treatments    However has been rather tachycardic after albuterol, will switch to DuoNebs with levalbuterol for future treatments  · Since patient did not take prednisone yet today, will give loading dose of 125 mg Solu-Medrol  Continue IV steroids 40 mg Q 8 hours for now  · Acute exacerbation likely in setting of viral URI  Check procalcitonin, hold off on further antibiotics for now  · Influenza negative    VTE Prophylaxis: low risk  / sequential compression device   Code Status: Level 1- Full Code  POLST: There is no POLST form on file for this patient (pre-hospital)  Discussion with family:  Discussed with patient and significant other at bedside    Anticipated Length of Stay:  Patient will be admitted on an Observation basis with an anticipated length of stay of  less than 2 midnights  Justification for Hospital Stay:  Sepsis    Total Time for Visit, including Counseling / Coordination of Care: 45 minutes  Greater than 50% of this total time spent on direct patient counseling and coordination of care  Chief Complaint:   Shortness of breath x1 day    History of Present Illness:    Fred Bradford is a 22 y o  female who presents with shortness of breath  Past medical history significant for asthma  Patient states that she was in the ED earlier this afternoon due to shortness of breath and wheezing  At that time she was given a breathing treatment and sent home on a prednisone taper  Patient noted that she continued to have worsening shortness of breath and wheezing despite using her nebulizer  She denies starting her prednisone at that time  Currently feels improved after breathing treatments, still notes some chest tightness  Patient notes several days of nasal congestion and mild sore throat leading up to her shortness of breath  Denies any fever/chills at that time, denies any recent sick contacts  States her cough is largely nonproductive  Denies any nausea/vomiting, abdominal pain, diarrhea      Review of Systems:    Review of Systems   Constitutional: Negative for chills, fatigue, fever and unexpected weight change  HENT: Positive for congestion, postnasal drip, sneezing and sore throat  Negative for trouble swallowing  Eyes: Negative for photophobia, pain and visual disturbance  Respiratory: Positive for cough, chest tightness, shortness of breath and wheezing  Cardiovascular: Negative for chest pain, palpitations and leg swelling  Gastrointestinal: Negative for abdominal pain, constipation, diarrhea, nausea and vomiting  Endocrine: Negative for polyuria  Genitourinary: Negative for difficulty urinating, dysuria, flank pain, hematuria and urgency  Musculoskeletal: Negative for back pain, myalgias, neck pain and neck stiffness  Skin: Negative for pallor and rash  Neurological: Negative for dizziness, tremors, syncope, weakness, light-headedness, numbness and headaches  Hematological: Does not bruise/bleed easily  Psychiatric/Behavioral: Negative for agitation and confusion  Past Medical and Surgical History:     Past Medical History:   Diagnosis Date    Allergic     Asthma     Eczema        History reviewed  No pertinent surgical history  Meds/Allergies:    Prior to Admission medications    Medication Sig Start Date End Date Taking? Authorizing Provider   albuterol (5 mg/mL) 0 5 % nebulizer solution Take 0 5 mL (2 5 mg total) by nebulization every 6 (six) hours as needed for wheezing 3/11/19   Jerod Sotelo PA-C   albuterol (PROVENTIL HFA,VENTOLIN HFA) 90 mcg/act inhaler Inhale 2 puffs every 4 (four) hours as needed for wheezing 3/11/19   Jerod Sotelo PA-C   predniSONE 20 mg tablet Take 3 tablets (60 mg total) by mouth daily for 5 days  Patient not taking: Reported on 3/11/2019 3/11/19 3/16/19  Jerod Sotelo PA-C     I have reviewed home medications with patient personally  Allergies:    Allergies   Allergen Reactions    Peanut Oil Anaphylaxis    Penicillins Anaphylaxis       Social History: Marital Status: Single   Occupation:  Unknown  Patient Pre-hospital Living Situation:  Lives with others  Patient Pre-hospital Level of Mobility:  Independent  Patient Pre-hospital Diet Restrictions:  None  Substance Use History:   Social History     Substance and Sexual Activity   Alcohol Use No     Social History     Tobacco Use   Smoking Status Never Smoker   Smokeless Tobacco Never Used     Social History     Substance and Sexual Activity   Drug Use No       Family History:    Family History   Family history unknown: Yes       Physical Exam:     Vitals:   Blood Pressure: 113/67 (03/12/19 0027)  Pulse: (!) 114 (03/12/19 0027)  Temperature: 99 9 °F (37 7 °C) (03/12/19 0027)  Temp Source: Temporal (03/12/19 0027)  Respirations: 22 (03/12/19 0027)  Weight - Scale: 73 9 kg (163 lb) (03/11/19 2033)  SpO2: 96 % (03/12/19 0027)    Physical Exam   Constitutional: She is oriented to person, place, and time  She appears well-developed  Patient is seated upright in bed, appears comfortable and in no acute distress  Able to speak in full sentences without difficulty  HENT:   Head: Normocephalic  Eyes: Pupils are equal, round, and reactive to light  Conjunctivae and EOM are normal  No scleral icterus  Neck: Normal range of motion  Cardiovascular: Regular rhythm and normal heart sounds  Tachycardia present  No murmur heard  Pulmonary/Chest: Effort normal  No accessory muscle usage  No respiratory distress  She has wheezes  Diffuse expiratory wheezes noted bilaterally  No accessory muscle use  Abdominal: Soft  Normal appearance and bowel sounds are normal  There is no tenderness  Musculoskeletal: She exhibits no edema, tenderness or deformity  Neurological: She is alert and oriented to person, place, and time  Skin: Skin is warm and dry  Psychiatric: She has a normal mood and affect   Her speech is normal and behavior is normal  Cognition and memory are normal    Nursing note and vitals reviewed  Additional Data:     Lab Results: I have personally reviewed pertinent reports  Results from last 7 days   Lab Units 03/11/19  2245   WBC Thousand/uL 9 00   HEMOGLOBIN g/dL 13 7   HEMATOCRIT % 42 5   PLATELETS Thousands/uL 259   NEUTROS PCT % 85*   LYMPHS PCT % 7*   MONOS PCT % 4   EOS PCT % 4     Results from last 7 days   Lab Units 03/11/19  2245   SODIUM mmol/L 138   POTASSIUM mmol/L 3 1*   CHLORIDE mmol/L 104   CO2 mmol/L 20*   BUN mg/dL 6   CREATININE mg/dL 0 54*   ANION GAP mmol/L 14   CALCIUM mg/dL 9 7   ALBUMIN g/dL 4 9   TOTAL BILIRUBIN mg/dL 0 40   ALK PHOS U/L 55   ALT U/L 21   AST U/L 26   GLUCOSE RANDOM mg/dL 91     Results from last 7 days   Lab Units 03/11/19  2245   INR  1 01             Results from last 7 days   Lab Units 03/11/19  2245   LACTIC ACID mmol/L 2 8*       Imaging: I have personally reviewed pertinent reports  XR chest 2 views   ED Interpretation by Kory Rosenberg DO (03/11 2327)   RLL infiltrate          EKG, Pathology, and Other Studies Reviewed on Admission:   · CXR: appears to have no acute processes, f/u final read    Allscripts / Epic Records Reviewed: Yes     ** Please Note: This note has been constructed using a voice recognition system   **

## 2019-03-12 NOTE — ASSESSMENT & PLAN NOTE
· Potassium noted to be 3 1 at time of admission  · Had been repleted with 40 mEq in the ED  · Re-check BMP in AM as well as magnesium

## 2019-03-12 NOTE — SEPSIS NOTE
Sepsis Note   Shane Gaspar 22 y o  female MRN: [id number]  Unit/Bed#:  Encounter: [id number]      Initial Sepsis Screening     Row Name 03/11/19 2327 03/11/19 2310             Is the patient's history suggestive of a new or worsening infection?  --  Yes (Proceed)  (Abnormal)   -AK       Suspected source of infection  --  pneumonia  -AK       Are two or more of the following signs & symptoms of infection both present and new to the patient?  --  Yes (Proceed)  (Abnormal)   -AK       Indicate SIRS criteria  --  Hyperthemia > 38 3C (100 9F); Tachycardia > 90 bpm;Tachypnea > 20 resp per min  -AK       If the answer is yes to both questions, suspicion of sepsis is present  --  --       If severe sepsis is present AND tissue hypoperfusion perists in the hour after fluid resuscitation or lactate > 4, the patient meets criteria for SEPTIC SHOCK  --  --       Are any of the following organ dysfunction criteria present within 6 hours of suspected infection and SIRS criteria that are NOT considered to be chronic conditions? Yes  (Abnormal)   -AK  --       Organ dysfunction  --  Lactate > 2 0 mmol/L  -AK       Date of presentation of severe sepsis  03/11/19  -AK  --       Time of presentation of severe sepsis  2228  -AK  --       Tissue hypoperfusion persists in the hour after crystalloid fluid administration, evidenced, by either:  --  --       Was hypotension present within one hour of the conclusion of crystalloid fluid administration?   No  -AK  --       Date of presentation of septic shock  --  --       Time of presentation of septic shock  --  --         User Key  (r) = Recorded By, (t) = Taken By, (c) = Cosigned By    Initials Name Provider Type    [address] [place] [address] Ne, DO Physician               Default Flowsheet Data (last 720 hours)      Sepsis Reassess     Row Name 03/12/19 0049 03/11/19 2348                Repeat Volume Status and Tissue Perfusion Assessment Performed    Repeat Volume Status and Tissue Perfusion Assessment Performed  Yes  -AG  Yes  -AG          Volume Status and Tissue Perfusion Post Fluid Resuscitation * Must Document All *    Vital Signs Reviewed (HR, RR, BP, T)  Yes  -AG  Yes  -AG       Shock Index Reviewed  Yes  -AG  Yes  -AG       Arterial Oxygen Saturation Reviewed (POx, SaO2 or SpO2)  Yes (comment %) SpO2 95% on RA  -AG  Yes (comment %)  -AG       Cardio  Normal S1/S2; Tachycardia; No murmor; No rub or gallop  (Abnormal)   -AG  --       Pulmonary  Normal effort; Wheezes  (Abnormal)   -AG  --       Capillary Refill  Brisk  -AG  --       Peripheral Pulses  Radial;Dorsalis Pedis  -AG  --       Peripheral Pulse  +2  -AG  --       Dorsalis Pedis  +2  -AG  --       Skin  Warm;Dry  -AG  --       Urine output assessed  --  --          *OR*   Intensive Monitoring- Must Document One of the Following Four *:    Vital Signs Reviewed  --  --       * Central Venous Pressure (CVP or RAP)  --  --       * Central Venous Oxygen (SVO2, ScvO2 or Oxygen saturation via central catheter)  --  --       * Bedside Cardiovascular US in IVC diameter and % collapse  --  --       * Passive Leg Raise OR Crystalloid Challenge  --  --         User Key  (r) = Recorded By, (t) = Taken By, (c) = Cosigned By    Initials Name Provider Type    Seamus Romero PA-C Physician Assistant

## 2019-03-12 NOTE — ASSESSMENT & PLAN NOTE
· Patient had presented to the emergency department earlier this afternoon for mild asthma exacerbation; patient was discharged at that time however returned a couple of hours later with increasing shortness of breath and wheezing  Patient reports generalized URI symptoms prior to asthma exacerbation  · Was found to be febrile with fever of 101 7, tachypneic, tachycardic  - s/p nebulizer treatment at home  · CXR:  ED was concerned for possible right lower lobe pneumonia, however do not appreciate a focal consolidation - compared to prior normal studies  F/u final read  · Influenza negative; possibly due to other viral syndrome  Will check respiratory pathogen PCR  · CBC without leukocytosis  CMP with hypokalemia of 3 1; was repleted with 40 mEq in the ED    Recheck in AM as well as magnesium  · UA negative  · Repeat lactic acid normal  · Procalcitonin pending  · Afebrile no tachycardia white count normal blood cultures negative urine is pending  · Clinically patient is improving continue to watch no further antibiotic  · X-rays pending result

## 2019-03-12 NOTE — ASSESSMENT & PLAN NOTE
· Patient was initially seen in the ED earlier today for mild exacerbation; was discharged home with prednisone taper and used her nebulizer without relief  States she did not start taking the prednisone yet  · Febrile, tachypneic and tachycardic on arrival to ED  SpO2 95% on RA  · States she does feel improved after nebulizer treatments  However has been rather tachycardic after albuterol, will switch to DuoNebs with levalbuterol for future treatments  · Since patient did not take prednisone yet today, will give loading dose of 125 mg Solu-Medrol  Continue IV steroids 40 mg Q 8 hours for now  · Acute exacerbation likely in setting of viral URI    Check procalcitonin, hold off on further antibiotics for now  · Influenza negative  · Afebrile tachycardia improved  · Lungs is still wheezing continue bronchodilator  · Steroid  · Hold off to antibiotic most probably precipitated by 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

## 2019-03-12 NOTE — ED NOTES
Patient ate full supper tray  Heart rate noted to be in the 1-teens - sinus  Room air saturation noted to be 9%       Tarun Augustin RN  03/12/19 7842

## 2019-03-12 NOTE — PLAN OF CARE
Anticipate discharge home w/ OP f/u when medically cleared  Transport provided by family  SW will continue to follow for plan of care

## 2019-03-12 NOTE — SEPSIS NOTE
Sepsis Note   Kaylee Corea 22 y o  female MRN: [id number]  Unit/Bed#: ED 07 Encounter: [id number]      Initial Sepsis Screening     [address] Name 03/11/19 2310                Is the patient's history suggestive of a new or worsening infection? Yes (Proceed)  (Abnormal)   -AK        Suspected source of infection  pneumonia  -AK        Are two or more of the following signs & symptoms of infection both present and new to the patient? Yes (Proceed)  (Abnormal)   -AK        Indicate SIRS criteria  Hyperthemia > 38 3C (100 9F); Tachycardia > 90 bpm;Tachypnea > 20 resp per min  -AK        If the answer is yes to both questions, suspicion of sepsis is present  --        If severe sepsis is present AND tissue hypoperfusion perists in the hour after fluid resuscitation or lactate > 4, the patient meets criteria for SEPTIC SHOCK  --        Are any of the following organ dysfunction criteria present within 6 hours of suspected infection and SIRS criteria that are NOT considered to be chronic conditions?   --        Organ dysfunction  Lactate > 2 0 mmol/L  -AK        Date of presentation of severe sepsis  --        Time of presentation of severe sepsis  --        Tissue hypoperfusion persists in the hour after crystalloid fluid administration, evidenced, by either:  --        Was hypotension present within one hour of the conclusion of crystalloid fluid administration?  --        Date of presentation of septic shock  --        Time of presentation of septic shock  --          User Key  (r) = Recorded By, (t) = Taken By, (c) = Cosigned By    [address] Name Provider Type    [address] [place] [address] Ne, DO Physician

## 2019-03-12 NOTE — ASSESSMENT & PLAN NOTE
· Patient had presented to the emergency department earlier this afternoon for mild asthma exacerbation; patient was discharged at that time however returned a couple of hours later with increasing shortness of breath and wheezing  Patient reports generalized URI symptoms prior to asthma exacerbation  · Was found to be febrile with fever of 101 7, tachypneic, tachycardic  - s/p nebulizer treatment at home  · CXR:  ED was concerned for possible right lower lobe pneumonia, however do not appreciate a focal consolidation - compared to prior normal studies  F/u final read  · Influenza negative; possibly due to other viral syndrome  Will check respiratory pathogen PCR  · CBC without leukocytosis  CMP with hypokalemia of 3 1; was repleted with 40 mEq in the ED  Recheck in AM as well as magnesium  · UA negative  · Lactic acid noted to be elevated at 2 8  Will recheck after fluid administration  · Received 2 L NSS bolus total in the ED  · BC x2 pending  · Received dose of levaquin in the ED    Will hold off on further antibiotics for now, check procalcitonin

## 2019-03-12 NOTE — ED NOTES
Patient is awake and alert  States that her headache is better  Monitor shows sinus tachycardia  Room air saturations 94-95% with expiratory wheezing noted  States that she is hungry - getting food from the cafeteria for her to eat  Respirations remain mid 20's  IV antibiotic and IV fluids absorbing as ordered and hung by previous RN       Nunu Teran, KHALIDA  03/11/19 7037

## 2019-03-12 NOTE — ED PROVIDER NOTES
History  Chief Complaint   Patient presents with    Asthma     pt states her asthma is acting up again  pt seen in this ER earlier today for same  pt states she went home and vomited as well  24-year-old female presents with complaint of recurrent asthma symptoms  She was evaluated earlier today and after receiving nebulization treatment she was discharged home  She states that she is not feeling any better and has had progressive symptoms since the time of discharge  She complains of diffuse chest tightness with wheezing and one episode of posttussive vomiting  Asthma   Severity:  Severe  Onset quality:  Gradual  Timing:  Constant  Progression:  Waxing and waning  Relieved by:  Nothing  Worsened by:  Coughing  Associated symptoms: congestion, cough, fatigue, rhinorrhea, shortness of breath, vomiting (once) and wheezing    Associated symptoms: no abdominal pain, no chest pain, no diarrhea, no ear pain, no fever, no headaches, no loss of consciousness, no myalgias, no nausea, no rash and no sore throat        Prior to Admission Medications   Prescriptions Last Dose Informant Patient Reported? Taking? albuterol (5 mg/mL) 0 5 % nebulizer solution   No No   Sig: Take 0 5 mL (2 5 mg total) by nebulization every 6 (six) hours as needed for wheezing   albuterol (PROVENTIL HFA,VENTOLIN HFA) 90 mcg/act inhaler   No No   Sig: Inhale 2 puffs every 4 (four) hours as needed for wheezing   predniSONE 20 mg tablet   No No   Sig: Take 3 tablets (60 mg total) by mouth daily for 5 days      Facility-Administered Medications: None       Past Medical History:   Diagnosis Date    Allergic     Asthma     Eczema        History reviewed  No pertinent surgical history  Family History   Family history unknown: Yes     I have reviewed and agree with the history as documented      Social History     Tobacco Use    Smoking status: Never Smoker    Smokeless tobacco: Never Used   Substance Use Topics    Alcohol use: No    Drug use: No        Review of Systems   Constitutional: Positive for fatigue  Negative for appetite change, chills and fever  HENT: Positive for congestion and rhinorrhea  Negative for ear pain, postnasal drip, sinus pain, sore throat and trouble swallowing  Eyes: Negative for redness and itching  Respiratory: Positive for cough, shortness of breath and wheezing  Negative for chest tightness  Cardiovascular: Negative for chest pain and leg swelling  Gastrointestinal: Positive for vomiting (once)  Negative for abdominal pain, constipation, diarrhea and nausea  Endocrine: Negative  Genitourinary: Negative for difficulty urinating and dysuria  Musculoskeletal: Negative for back pain and myalgias  Skin: Negative for rash  Allergic/Immunologic: Negative  Neurological: Negative for dizziness, loss of consciousness, numbness and headaches  Hematological: Negative  Psychiatric/Behavioral: Negative  Physical Exam  Physical Exam   Constitutional: She is oriented to person, place, and time  She appears well-developed and well-nourished  HENT:   Head: Normocephalic and atraumatic  Nose: Nose normal    Mouth/Throat: Oropharynx is clear and moist    Eyes: Pupils are equal, round, and reactive to light  Conjunctivae and EOM are normal    Neck: Normal range of motion  Neck supple  Cardiovascular: Normal rate, regular rhythm and normal heart sounds  Pulmonary/Chest: Effort normal  No respiratory distress  She has wheezes  Abdominal: Soft  Bowel sounds are normal  There is no tenderness  There is no guarding  Musculoskeletal: She exhibits no edema, tenderness or deformity  Neurological: She is alert and oriented to person, place, and time  Skin: Skin is warm and dry  Capillary refill takes less than 2 seconds  No rash noted  Psychiatric: She has a normal mood and affect  Her behavior is normal    Nursing note and vitals reviewed        Vital Signs  ED Triage Vitals [03/11/19 2033]   Temperature Pulse Respirations Blood Pressure SpO2   (!) 101 7 °F (38 7 °C) (!) 118 (!) 24 107/68 94 %      Temp Source Heart Rate Source Patient Position - Orthostatic VS BP Location FiO2 (%)   Tympanic Monitor Sitting Left arm --      Pain Score       --           Vitals:    03/11/19 2033 03/11/19 2236 03/11/19 2329   BP: 107/68 126/64 110/53   Pulse: (!) 118 (!) 127 (!) 124   Patient Position - Orthostatic VS: Sitting Sitting Lying       Visual Acuity      ED Medications  Medications   sodium chloride 0 9 % bolus 1,000 mL (1,000 mL Intravenous New Bag 3/11/19 2257)   levofloxacin (LEVAQUIN) IVPB (premix) 750 mg (750 mg Intravenous New Bag 3/11/19 2308)   sodium chloride 0 9 % bolus 1,000 mL (1,000 mL Intravenous New Bag 3/11/19 2313)   potassium chloride (K-DUR,KLOR-CON) CR tablet 40 mEq (has no administration in time range)   albuterol inhalation solution 5 mg (5 mg Nebulization Given 3/11/19 2047)   ipratropium (ATROVENT) 0 02 % inhalation solution 0 5 mg (0 5 mg Nebulization Given 3/11/19 2047)   albuterol inhalation solution 10 mg (10 mg Nebulization Given 3/11/19 2137)   ipratropium (ATROVENT) 0 02 % inhalation solution 1 mg (1 mg Nebulization Given 3/11/19 2136)   acetaminophen (TYLENOL) tablet 975 mg (975 mg Oral Given 3/11/19 2237)   magnesium sulfate 2 g/50 mL IVPB (premix) 2 g (0 g Intravenous Stopped 3/11/19 2310)       Diagnostic Studies  Results Reviewed     Procedure Component Value Units Date/Time    Rapid Flu-Viral RNA amplification ([facility] ONLY) [[id number]]  (Normal) Collected:  03/11/19 2239    Lab Status:  Final result Specimen:  Nares from Nose Updated:  03/11/19 2309     INFLUENZA A AMPLIFIED RNA Not Detected     INFLUENZA B AMPLIFIED Not Detected    Lactic acid, plasma [[id number]]  (Abnormal) Collected:  03/11/19 2245    Lab Status:  Final result Specimen:  Blood from Arm, Left Updated:  03/11/19 2308     LACTIC ACID 2 8 mmol/L     Narrative:       Result may be elevated if tourniquet was used during collection  Protime-INR [[id number]]  (Normal) Collected:  03/11/19 2245    Lab Status:  Final result Specimen:  Blood from Arm, Left Updated:  03/11/19 2306     Protime 10 7 seconds      INR 1 01    APTT [[id number]]  (Normal) Collected:  03/11/19 2245    Lab Status:  Final result Specimen:  Blood from Arm, Left Updated:  03/11/19 2306     PTT 24 seconds     Blood culture #2 [[id number]] Collected:  03/11/19 2244    Lab Status: In process Specimen:  Blood from Arm, Right Updated:  03/11/19 2306    Comprehensive metabolic panel [[id number]]  (Abnormal) Collected:  03/11/19 2245    Lab Status:  Final result Specimen:  Blood from Arm, Left Updated:  03/11/19 2305     Sodium 138 mmol/L      Potassium 3 1 mmol/L      Chloride 104 mmol/L      CO2 20 mmol/L      ANION GAP 14 mmol/L      BUN 6 mg/dL      Creatinine 0 54 mg/dL      Glucose 91 mg/dL      Calcium 9 7 mg/dL      AST 26 U/L      ALT 21 U/L      Alkaline Phosphatase 55 U/L      Total Protein 8 1 g/dL      Albumin 4 9 g/dL      Total Bilirubin 0 40 mg/dL      eGFR 135 ml/min/1 73sq m     Narrative:       National Kidney Disease Education Program recommendations are as follows:  GFR calculation is accurate only with a steady state creatinine  Chronic Kidney disease less than 60 ml/min/1 73 sq  meters  Kidney failure less than 15 ml/min/1 73 sq  meters      CBC and differential [[id number]]  (Abnormal) Collected:  03/11/19 2245    Lab Status:  Final result Specimen:  Blood from Arm, Left Updated:  03/11/19 2255     WBC 9 00 Thousand/uL      RBC 4 55 Million/uL      Hemoglobin 13 7 g/dL      Hematocrit 42 5 %      MCV 94 fL      MCH 30 2 pg      MCHC 32 3 g/dL      RDW 13 4 %      MPV 9 5 fL      Platelets 481 Thousands/uL      Neutrophils Relative 85 %      Lymphocytes Relative 7 %      Monocytes Relative 4 %      Eosinophils Relative 4 %      Basophils Relative 0 %      Neutrophils Absolute 7 60 Thousands/µL      Lymphocytes Absolute 0 60 Thousands/µL      Monocytes Absolute 0 40 Thousand/µL      Eosinophils Absolute 0 30 Thousand/µL      Basophils Absolute 0 00 Thousands/µL     Blood culture #1 [[id number]] Collected:  03/11/19 2244    Lab Status: In process Specimen:  Blood from Arm, Left Updated:  03/11/19 2254    UA w Reflex to Microscopic [[id number]]     Lab Status:  No result Specimen:  Urine     POCT pregnancy, urine [[id number]]     Lab Status:  No result                  XR chest 2 views   ED Interpretation by May Saleh DO (03/11 2327)   RLL infiltrate                 Procedures  Procedures       Phone Contacts  ED Phone Contact    ED Course                   Initial Sepsis Screening     [address] Name 03/11/19 2327 03/11/19 2310             Is the patient's history suggestive of a new or worsening infection?  --  Yes (Proceed)  (Abnormal)   -AK       Suspected source of infection  --  pneumonia  -AK       Are two or more of the following signs & symptoms of infection both present and new to the patient?  --  Yes (Proceed)  (Abnormal)   -AK       Indicate SIRS criteria  --  Hyperthemia > 38 3C (100 9F); Tachycardia > 90 bpm;Tachypnea > 20 resp per min  -AK       If the answer is yes to both questions, suspicion of sepsis is present  --  --       If severe sepsis is present AND tissue hypoperfusion perists in the hour after fluid resuscitation or lactate > 4, the patient meets criteria for SEPTIC SHOCK  --  --       Are any of the following organ dysfunction criteria present within 6 hours of suspected infection and SIRS criteria that are NOT considered to be chronic conditions?   Yes  (Abnormal)   -AK  --       Organ dysfunction  --  Lactate > 2 0 mmol/L  -AK       Date of presentation of severe sepsis  03/11/19  -AK  --       Time of presentation of severe sepsis  2228 -AK  --       Tissue hypoperfusion persists in the hour after crystalloid fluid administration, evidenced, by either:  --  --       Was hypotension present within one hour of the conclusion of crystalloid fluid administration? No  -AK  --       Date of presentation of septic shock  --  --       Time of presentation of septic shock  --  --         User Key  (r) = Recorded By, (t) = Taken By, (c) = Cosigned By    [address] Name Provider Type    [address] [place] [address] Ne, DO Physician                  MDM  Number of Diagnoses or Management Options  Asthma exacerbation:   Hypokalemia:   Pneumonia:   Diagnosis management comments: 25-year-old female presents with recurrent and ongoing symptoms of asthma  After receiving an hour long treatment and a DuoNeb, the patient had a peak flow of only 210  She then spiked a temperature is well and chest x-ray shows probable right lower lobe pneumonia  Plan to admit for further treatment  Amount and/or Complexity of Data Reviewed  Clinical lab tests: reviewed and ordered  Tests in the radiology section of CPT®: ordered and reviewed  Tests in the medicine section of CPT®: ordered and reviewed  Discuss the patient with other providers: yes  Independent visualization of images, tracings, or specimens: yes        Disposition  Final diagnoses:   Asthma exacerbation   Pneumonia   Hypokalemia     Time reflects when diagnosis was documented in both MDM as applicable and the Disposition within this note     Time User Action Codes Description Comment    3/11/2019 11:00 PM Bernardo Bacca Add [T87 860] Asthma exacerbation     3/11/2019 11:00 PM Bernardo Bacca Add [J18 9] Pneumonia     3/11/2019 11:26 PM Bernardo Bacca Add [E87 6] Hypokalemia       ED Disposition     ED Disposition Condition Date/Time Comment    Admit Stable Mon Mar 11, 2019 11:00 PM Case was discussed with Ashlee Oneal and the patient's admission status was agreed to be Admission Status: observation status to the service of Dr Nino Lundberg   Follow-up Information    None         Patient's Medications   Discharge Prescriptions    No medications on file     No discharge procedures on file      ED Provider  Electronically Signed by           Divya Cabrera,   03/11/19 3661

## 2019-03-12 NOTE — UTILIZATION REVIEW
Initial Clinical Review    Admission: Date/Time/Statement: 3/11/19 @ 2300 OBSERVATION  Orders Placed This Encounter   Procedures    Place in Observation (expected length of stay for this patient is less than two midnights)     Standing Status:   Standing     Number of Occurrences:   1     Order Specific Question:   Admitting Physician     Answer:   Rock Chamorro [[id number]]     Order Specific Question:   Level of Care     Answer:   Med Surg [16]     ED: Date/Time/Mode of Arrival:   ED Arrival Information     Expected Arrival Acuity Means of Arrival Escorted By Service Admission Type    - 3/11/2019 20:24 Urgent Walk-In Self General Medicine Urgent    Arrival Complaint    Vomiting         Chief Complaint:   Chief Complaint   Patient presents with    Asthma     pt states her asthma is acting up again  pt seen in this ER earlier today for same  pt states she went home and vomited as well  Assessment/Plan: 21 y/o female with hx of asthma presents to ED from home with recurrent asthma symptoms  She was evaluated in ED earlier today and after receiving nebulization treatment she was discharged home on prednisone taper  She states that she is not feeling any better and has had progressive symptoms since the time of discharge  Patient noted that she continued to have worsening shortness of breath and wheezing despite using her nebulizer  She complains of diffuse chest tightness with wheezing and one episode of posttussive vomiting  On exam, pt has tachycardia and diffuse expiratory wheezes bilaterally  Admitted as observation due to sepsis, acute asthma exacerbation, hypokalemia  CXR:  ED was concerned for possible right lower lobe pneumonia, however do not appreciate a focal consolidation - compared to prior normal studies  F/u final read  Check respiratory pathogen PCR  Replete K+ and recheck am labs  Continue IVF  Blood cx pending  Continue IV solumedrol    3/12 Pt with continued wheezing on exam   O2 sat 87% on RA  2L NC O2 applied  ED Vital Signs:   ED Triage Vitals   Temperature Pulse Respirations Blood Pressure SpO2   03/11/19 2033 03/11/19 2033 03/11/19 2033 03/11/19 2033 03/11/19 2033   (!) 101 7 °F (38 7 °C) (!) 118 (!) 24 107/68 94 %      Temp Source Heart Rate Source Patient Position - Orthostatic VS BP Location FiO2 (%)   03/11/19 2033 03/11/19 2033 03/11/19 2033 03/11/19 2033 --   Tympanic Monitor Sitting Left arm       Pain Score       03/11/19 2345       5        Wt Readings from Last 1 Encounters:   03/12/19 74 6 kg (164 lb 7 4 oz)     Vital Signs (abnormal):   03/12/19 0836 -- -- -- -- 93 % Nasal cannula 2L   03/12/19 0835 -- -- -- -- 87 %  None (Room air)   03/12/19 0800 -- -- -- -- 87 % None (Room air)   03/12/19 0027 99 9 °F (37 7 °C) 114 22 113/67 96 % None (Room air)   03/12/19 0009 -- 113 24  105/57 96 % None (Room air)   03/11/19 2329 -- 124 26 110/53 95 % None (Room air)   03/11/19 2236 100 °F (37 8 °C) 127 28 126/64 95 % None (Room air)       Pertinent Labs/Diagnostic Test Results:   K+ 3 1  CO2 20, 19  Lactic acid 2 8  Segs 87  Bands 2  UA 10 0 blood, 1-2 RBC, 1-2 WBC, moderate bacteria  Influenza A/B negative  EKG:  Normal sinus rhythm  CXR:  No acute cardiopulmonary disease      ED Treatment:   Medication Administration from 03/11/2019 2023 to 03/12/2019 0026       Date/Time Order Dose Route Action Action by Comments     03/11/2019 2047 albuterol inhalation solution 5 mg 5 mg Nebulization Given Reena Monsivais RN      03/11/2019 2047 ipratropium (ATROVENT) 0 02 % inhalation solution 0 5 mg 0 5 mg Nebulization Given Reena Monsivais RN      03/11/2019 2137 albuterol inhalation solution 10 mg 10 mg Nebulization Given Reena Monsivais RN      03/11/2019 2136 ipratropium (ATROVENT) 0 02 % inhalation solution 1 mg 1 mg Nebulization Given Reena Monsivais RN      03/11/2019 2237 acetaminophen (TYLENOL) tablet 975 mg 975 mg Oral Given Antonella Borden RN      03/11/2019 8598 sodium chloride 0 9 % bolus 1,000 mL 1,000 mL Intravenous New Bag [facility]      03/11/2019 2257 magnesium sulfate 2 g/50 mL IVPB (premix) 2 g 2 g Intravenous New [address], RN      03/11/2019 2308 levofloxacin (LEVAQUIN) IVPB (premix) 750 mg 750 mg Intravenous [address] Russell Smith RN      03/11/2019 2313 sodium chloride 0 9 % bolus 1,000 mL 1,000 mL Intravenous New [address] Russell Smith RN      03/11/2019 2335 potassium chloride (K-DUR,KLOR-CON) CR tablet 40 mEq 40 mEq Oral Given Fernando Leonard RN         Past Medical/Surgical History: Active Ambulatory Problems     Diagnosis Date Noted    No Active Ambulatory Problems     Resolved Ambulatory Problems     Diagnosis Date Noted    No Resolved Ambulatory Problems     Past Medical History:   Diagnosis Date    Allergic     Asthma     Eczema      Admitting Diagnosis: Hypokalemia [E87 6]  Pneumonia [J18 9]  Asthma [J45 909]  Asthma exacerbation [J45 901]  Age/Sex: 22 y o  female     Admission Orders:  VS  Tele  SCDs  Labs  Blood cx x2  House diet    Scheduled Meds:   Current Facility-Administered Medications:  acetaminophen 650 mg Oral Q6H PRN   albuterol 2 puff Inhalation Q4H PRN   benzonatate 100 mg Oral TID PRN x1   guaiFENesin 600 mg Oral BID   sodium chloride 3 mL Nebulization TID   And      levalbuterol 1 25 mg Nebulization TID   methylPREDNISolone sodium succinate 40 mg Intravenous Q8H [address]   ondansetron 4 mg Intravenous Q6H PRN x1   sodium chloride 100 mL/hr Intravenous Continuous     Network Utilization Review Department  Phone: [phone]; Fax [phone]  [email or web address]  ATTENTION: Please call with any questions or concerns to [phone]  and carefully listen to the prompts so that you are directed to the right person  Send all requests for admission clinical reviews, approved or denied determinations and any other requests to fax [phone]   All voicemails are confidential

## 2019-03-12 NOTE — ASSESSMENT & PLAN NOTE
· Potassium noted to be 3 1 at time of admission  · Had been repleted with 40 mEq in the ED  · Re-check BMP in AM as well as magnesium  · Electrolyte normal

## 2019-03-13 ENCOUNTER — TRANSITIONAL CARE MANAGEMENT (OUTPATIENT)
Dept: FAMILY MEDICINE CLINIC | Facility: CLINIC | Age: 19
End: 2019-03-13

## 2019-03-13 VITALS
RESPIRATION RATE: 22 BRPM | SYSTOLIC BLOOD PRESSURE: 102 MMHG | OXYGEN SATURATION: 95 % | TEMPERATURE: 97.6 F | WEIGHT: 164.46 LBS | HEART RATE: 94 BPM | BODY MASS INDEX: 25.81 KG/M2 | HEIGHT: 67 IN | DIASTOLIC BLOOD PRESSURE: 49 MMHG

## 2019-03-13 LAB
ERYTHROCYTE [DISTWIDTH] IN BLOOD BY AUTOMATED COUNT: 13.2 %
HCT VFR BLD AUTO: 37.4 % (ref 36–46)
HGB BLD-MCNC: 12.2 G/DL (ref 12–16)
MCH RBC QN AUTO: 30.7 PG (ref 26–34)
MCHC RBC AUTO-ENTMCNC: 32.7 G/DL (ref 31–36)
MCV RBC AUTO: 94 FL (ref 80–100)
PLATELET # BLD AUTO: 238 THOUSANDS/UL (ref 150–450)
PMV BLD AUTO: 9.6 FL (ref 8.9–12.7)
RBC # BLD AUTO: 3.98 MILLION/UL (ref 4–5.2)
WBC # BLD AUTO: 10.6 THOUSAND/UL (ref 4.5–11)

## 2019-03-13 PROCEDURE — 94640 AIRWAY INHALATION TREATMENT: CPT

## 2019-03-13 PROCEDURE — 94150 VITAL CAPACITY TEST: CPT

## 2019-03-13 PROCEDURE — 94760 N-INVAS EAR/PLS OXIMETRY 1: CPT

## 2019-03-13 PROCEDURE — 99217 PR OBSERVATION CARE DISCHARGE MANAGEMENT: CPT | Performed by: INTERNAL MEDICINE

## 2019-03-13 PROCEDURE — 85027 COMPLETE CBC AUTOMATED: CPT | Performed by: INTERNAL MEDICINE

## 2019-03-13 RX ORDER — PREDNISONE 20 MG/1
40 TABLET ORAL DAILY
Status: DISCONTINUED | OUTPATIENT
Start: 2019-03-13 | End: 2019-03-13 | Stop reason: HOSPADM

## 2019-03-13 RX ORDER — BUDESONIDE AND FORMOTEROL FUMARATE DIHYDRATE 80; 4.5 UG/1; UG/1
2 AEROSOL RESPIRATORY (INHALATION) 2 TIMES DAILY
Status: DISCONTINUED | OUTPATIENT
Start: 2019-03-13 | End: 2019-03-13 | Stop reason: HOSPADM

## 2019-03-13 RX ORDER — GUAIFENESIN 600 MG
600 TABLET, EXTENDED RELEASE 12 HR ORAL 2 TIMES DAILY
Qty: 12 TABLET | Refills: 0 | Status: SHIPPED | OUTPATIENT
Start: 2019-03-13

## 2019-03-13 RX ORDER — BUDESONIDE AND FORMOTEROL FUMARATE DIHYDRATE 160; 4.5 UG/1; UG/1
2 AEROSOL RESPIRATORY (INHALATION) 2 TIMES DAILY
Qty: 1 INHALER | Refills: 0 | Status: SHIPPED | OUTPATIENT
Start: 2019-03-13 | End: 2020-05-18 | Stop reason: SDUPTHER

## 2019-03-13 RX ORDER — PREDNISONE 20 MG/1
40 TABLET ORAL DAILY
Qty: 7 TABLET | Refills: 0 | Status: SHIPPED | OUTPATIENT
Start: 2019-03-13 | End: 2019-12-04 | Stop reason: ALTCHOICE

## 2019-03-13 RX ADMIN — SODIUM CHLORIDE 100 ML/HR: 9 INJECTION, SOLUTION INTRAVENOUS at 01:45

## 2019-03-13 RX ADMIN — BENZONATATE 100 MG: 100 CAPSULE ORAL at 02:03

## 2019-03-13 RX ADMIN — GUAIFENESIN 600 MG: 600 TABLET, EXTENDED RELEASE ORAL at 08:32

## 2019-03-13 RX ADMIN — LEVALBUTEROL HYDROCHLORIDE 1.25 MG: 1.25 SOLUTION, CONCENTRATE RESPIRATORY (INHALATION) at 08:49

## 2019-03-13 RX ADMIN — METHYLPREDNISOLONE SODIUM SUCCINATE 40 MG: 40 INJECTION, POWDER, FOR SOLUTION INTRAMUSCULAR; INTRAVENOUS at 06:05

## 2019-03-13 RX ADMIN — PREDNISONE 40 MG: 20 TABLET ORAL at 09:43

## 2019-03-13 RX ADMIN — ISODIUM CHLORIDE 3 ML: 0.03 SOLUTION RESPIRATORY (INHALATION) at 08:46

## 2019-03-13 NOTE — NURSING NOTE
A&Ox3  No c/o pain  Expiratory wheezing noted  Dry non productive cough noted  No SOB  SR on teli monitor  No edema  +PP  ABD soft NT ND with +BS  Skin warm D&I  VSS  Resting comfortably with call bell in reach

## 2019-03-13 NOTE — ASSESSMENT & PLAN NOTE
· Patient had presented to the emergency department earlier this afternoon for mild asthma exacerbation; patient was discharged at that time however returned a couple of hours later with increasing shortness of breath and wheezing  Patient reports generalized URI symptoms prior to asthma exacerbation  · Was found to be febrile with fever of 101 7, tachypneic, tachycardic  - s/p nebulizer treatment at home  · CXR:  ED was concerned for possible right lower lobe pneumonia, however do not appreciate a focal consolidation - compared to prior normal studies  F/u final read  · Influenza negative; possibly due to other viral syndrome  Will check respiratory pathogen PCR  · CBC without leukocytosis  CMP with hypokalemia of 3 1; was repleted with 40 mEq in the ED    Recheck in AM as well as magnesium  · UA negative  · Repeat lactic acid normal  · Blood culture negative  · She is afebrile  · X-ray chest negative  · Sepsis resolved  ·   ·

## 2019-03-13 NOTE — NURSING NOTE
A&Ox3  No c/o pain  Lungs CTA and diminished do to poor effort  No SOB  SR on teli monitor  No edema  +PP  ABD soft NT ND with +BS  Skin warm D&I  VSS  Resting comfortably with call bell in reach

## 2019-03-13 NOTE — DISCHARGE SUMMARY
Discharging Physician / Practitioner: Helen Santana MD  PCP: Jen Sacks, MD  Admission Date:   Admission Orders (From admission, onward)    Ordered        03/11/19 2300  Place in Observation (expected length of stay for this patient is less than two midnights)  Once     Order ID Start Status   [id number] 03/11/19 2301 Completed              Discharge Date: 03/13/19    Resolved Problems  Date Reviewed: 3/13/2019    None          Consultations During Hospital Stay:  · None    Procedures Performed:   · None    Significant Findings / Test Results:   · None    Incidental Findings:   · None     Test Results Pending at Discharge (will require follow up): · None     Outpatient Tests Requested:  · None    Complications:  None    Reason for Admission:  Acute short of breath acute asthma attack    Hospital Course:     Andrea Castleman is a 22 y o  female patient who originally presented to the hospital on 3/11/2019 due to acute shortness of breath and coughing seen her doctor put her on p o  Steroid no improvement got worse unable to breathe came in emergency room admitted for acute asthma attack right area mute at bedtime sepsis most probably tachycardia tachypneic secondary to acute asthma culture was negative influenza negative x-ray negative for numb patient clinically improved with steroid and bronchodilator medically stable okay to be discharged patient did not have at home steroid inhaler to his given prescription patient received education about asthma and treatment plan      Please see above list of diagnoses and related plan for additional information       Condition at Discharge: fair     Discharge Day Visit / Exam:     Subjective:  Feels better shortness of breath much improved no acute coughing no chills no fever stable to be discharged  Vitals: Blood Pressure: (!) 102/49 (03/13/19 0732)  Pulse: 94 (03/13/19 0849)  Temperature: 97 6 °F (36 4 °C) (03/13/19 0732)  Temp Source: Temporal (03/13/19 7500)  Respirations: 22 (03/13/19 0849)  Height: 5' 7" (170 2 cm) (03/12/19 0027)  Weight - Scale: 74 6 kg (164 lb 7 4 oz) (03/12/19 0027)  SpO2: 95 % (03/13/19 0849)  Exam:   Physical Exam    Discussion with Family:  Grandparent    Discharge instructions/Information to patient and family:   See after visit summary for information provided to patient and family  Provisions for Follow-Up Care:  See after visit summary for information related to follow-up care and any pertinent home health orders  Disposition:     Home    For Discharges to [facility] SNF:   · Not Applicable to this Patient - Not Applicable to this Patient    Planned Readmission:  None     Discharge Statement:  I spent 35 minutes minutes discharging the patient  This time was spent on the day of discharge  I had direct contact with the patient on the day of discharge  Greater than 50% of the total time was spent examining patient, answering all patient questions, arranging and discussing plan of care with patient as well as directly providing post-discharge instructions  Additional time then spent on discharge activities  Discharge Medications:  See after visit summary for reconciled discharge medications provided to patient and family        ** Please Note: This note has been constructed using a voice recognition system **    Discharge- Lis Jama 2000, 22 y o  female MRN: [id number]    Unit/Bed#:  Encounter: [id number]    Primary Care Provider: Daryl Goldsmith MD   Date and time admitted to hospital: 3/11/2019  8:39 PM        Hypokalemia  Assessment & Plan  · Potassium noted to be 3 1 at time of admission  · Had been repleted with 40 mEq in the ED  · Re-check BMP in AM as well as magnesium  · Electrolyte normal    Acute asthma exacerbation  Assessment & Plan  · Patient was initially seen in the ED earlier today for mild exacerbation; was discharged home with prednisone taper and used her nebulizer without relief  States she did not start taking the prednisone yet  · Febrile, tachypneic and tachycardic on arrival to ED  SpO2 95% on RA  · States she does feel improved after nebulizer treatments  However has been rather tachycardic after albuterol, will switch to DuoNebs with levalbuterol for future treatments  · Since patient did not take prednisone yet today, will give loading dose of 125 mg Solu-Medrol  Continue IV steroids 40 mg Q 8 hours for now  · Acute exacerbation likely in setting of viral URI  Check procalcitonin, hold off on further antibiotics for now  · Influenza negative  · Afebrile tachycardia improved  · Feels much better  · Peak flow to 280 but poor effort  · Lungs are bilateral wheezing but much more clear good breath sound at bases  · No tachycardia noted tachypneic does much better ready to be discharged  · Patient does not have steroid inhaler patient received education about her asthma treatment with respiratory check also from me  ·   ·         * Sepsis [facility])  Assessment & Plan  · Patient had presented to the emergency department earlier this afternoon for mild asthma exacerbation; patient was discharged at that time however returned a couple of hours later with increasing shortness of breath and wheezing  Patient reports generalized URI symptoms prior to asthma exacerbation  · Was found to be febrile with fever of 101 7, tachypneic, tachycardic  - s/p nebulizer treatment at home  · CXR:  ED was concerned for possible right lower lobe pneumonia, however do not appreciate a focal consolidation - compared to prior normal studies  F/u final read  · Influenza negative; possibly due to other viral syndrome  Will check respiratory pathogen PCR  · CBC without leukocytosis  CMP with hypokalemia of 3 1; was repleted with 40 mEq in the ED    Recheck in AM as well as magnesium  · UA negative  · Repeat lactic acid normal  · Blood culture negative  · She is afebrile  · X-ray chest negative  · Sepsis resolved  ·   ·

## 2019-03-13 NOTE — ASSESSMENT & PLAN NOTE
· Patient was initially seen in the ED earlier today for mild exacerbation; was discharged home with prednisone taper and used her nebulizer without relief  States she did not start taking the prednisone yet  · Febrile, tachypneic and tachycardic on arrival to ED  SpO2 95% on RA  · States she does feel improved after nebulizer treatments  However has been rather tachycardic after albuterol, will switch to DuoNebs with levalbuterol for future treatments  · Since patient did not take prednisone yet today, will give loading dose of 125 mg Solu-Medrol  Continue IV steroids 40 mg Q 8 hours for now  · Acute exacerbation likely in setting of viral URI    Check procalcitonin, hold off on further antibiotics for now  · Influenza negative  · Afebrile tachycardia improved  · Feels much better  · Peak flow to 280 but poor effort  · Lungs are bilateral wheezing but much more clear good breath sound at bases  · No tachycardia noted tachypneic does much better ready to be discharged  · Patient does not have steroid inhaler patient received education about her asthma treatment with respiratory check also from me  ·   ·

## 2019-03-13 NOTE — NURSING NOTE
IV removed with tip intact  Pressure held to control bleeding  Discharge instructions discussed with patient and verbalizes understanding  Patient left with all her belongings, prescriptions and discharge instructions

## 2019-03-17 LAB
BACTERIA BLD CULT: NORMAL
BACTERIA BLD CULT: NORMAL

## 2019-12-04 ENCOUNTER — OFFICE VISIT (OUTPATIENT)
Dept: URGENT CARE | Facility: CLINIC | Age: 19
End: 2019-12-04
Payer: COMMERCIAL

## 2019-12-04 VITALS
OXYGEN SATURATION: 98 % | DIASTOLIC BLOOD PRESSURE: 70 MMHG | HEART RATE: 79 BPM | SYSTOLIC BLOOD PRESSURE: 116 MMHG | RESPIRATION RATE: 16 BRPM | HEIGHT: 67 IN | BODY MASS INDEX: 29.19 KG/M2 | TEMPERATURE: 99.1 F | WEIGHT: 186 LBS

## 2019-12-04 DIAGNOSIS — J06.9 VIRAL UPPER RESPIRATORY TRACT INFECTION: ICD-10-CM

## 2019-12-04 DIAGNOSIS — J45.21 MILD INTERMITTENT ASTHMA WITH ACUTE EXACERBATION: Primary | ICD-10-CM

## 2019-12-04 PROCEDURE — G0382 LEV 3 HOSP TYPE B ED VISIT: HCPCS | Performed by: PHYSICIAN ASSISTANT

## 2019-12-04 RX ORDER — PREDNISONE 10 MG/1
TABLET ORAL
Qty: 21 TABLET | Refills: 0 | Status: SHIPPED | OUTPATIENT
Start: 2019-12-04 | End: 2020-05-18 | Stop reason: ALTCHOICE

## 2019-12-04 NOTE — LETTER
December 4, 2019     Patient: Jj Barragan   YOB: 2000   Date of Visit: 12/4/2019       To Whom it May Concern:    Jj Barragan was seen in my clinic on 12/4/2019  If you have any questions or concerns, please don't hesitate to call           Sincerely,          Ana Mead PA-C        CC: No Recipients

## 2019-12-04 NOTE — PATIENT INSTRUCTIONS
Sudafed OTC  Antihistamine like allegra or zyrtec  Albuterol as needed  Prednisone as directed  If no improvement in 2-3 days f/u with PCP   If anything changes or worsens f/u sooner or go to the ER

## 2019-12-04 NOTE — PROGRESS NOTES
NAME: Leander De Los Santos is a 22 y o  female  : 2000    MRN: [id number]      Assessment and Plan   Mild intermittent asthma with acute exacerbation [J45 21]  1  Mild intermittent asthma with acute exacerbation  predniSONE 10 mg tablet   2  Viral upper respiratory tract infection        discussed with patient how her symptoms may not have gotten better as this is most likely a virus  Her oxygen saturation is normal, she speaking in full sentences and no coughing is present on exam   She is already on azithromycin  Will prescribe another round of prednisone for her to have just in case  Discussed watchful waiting  Told her to try over-the-counter medications like a decongestant and antihistamine  She should follow up with her family doctor for re-evaluation in 2 days  She is asking for a work note  Patient Instructions   Patient Instructions   Sudafed OTC  Antihistamine like allegra or zyrtec  Albuterol as needed  Prednisone as directed  If no improvement in 2-3 days f/u with PCP   If anything changes or worsens f/u sooner or go to the ER    Proceed to ER if symptoms worsen  Chief Complaint     Chief Complaint   Patient presents with    Sinusitis     Pt states she has head sinus pressure with headache since Saturday  Pt has asthma and states she has needed to use her neb tmts  Denies SOB  Pulse ox 98%  History of Present Illness   Patient with past medical history of mild intermittent asthma presents complaining of cough and congestion x4 days  She states last week she started with cough and congestion and was seen in urgent care  She was prescribed prednisone and azithromycin  She states a couple days later she started to develop sinus pain and pressure, stuffy and runny nose and worsening cough  She also reports some ear pressure and popping  Reports some intermittent fevers with T-max 100 1° on Monday  Denies any sore throat    She reports she continues to cough and feels that this is "worsening my asthma    She denies any chest pain, palpitations, shortness of breath or dyspnea  Reports she has also tried taking Mucinex and ibuprofen  Reports she uses her nebulizers daily but states she has had no improvement in the past 4 days  Review of Systems   Review of Systems   Constitutional: Positive for fever  Negative for chills  HENT: Positive for congestion, ear pain, rhinorrhea, sinus pressure and sinus pain  Negative for sore throat  Respiratory: Positive for cough  Negative for chest tightness, shortness of breath, wheezing and stridor  Cardiovascular: Negative for chest pain, palpitations and leg swelling  Current Medications       Current Outpatient Medications:     albuterol (5 mg/mL) 0 5 % nebulizer solution, Take 0 5 mL (2 5 mg total) by nebulization every 6 (six) hours as needed for wheezing, Disp: 20 mL, Rfl: 0    albuterol (PROVENTIL HFA,VENTOLIN HFA) 90 mcg/act inhaler, Inhale 2 puffs every 4 (four) hours as needed for wheezing, Disp: 1 Inhaler, Rfl: 0    budesonide-formoterol (SYMBICORT) 160-4 5 mcg/act inhaler, Inhale 2 puffs 2 (two) times a day Rinse mouth after use , Disp: 1 Inhaler, Rfl: 0    guaiFENesin (MUCINEX) 600 mg 12 hr tablet, Take 1 tablet (600 mg total) by mouth 2 (two) times a day, Disp: 12 tablet, Rfl: 0    predniSONE 10 mg tablet, Take 6 tablets today, 5 tablets tomorrow, 4 the next day, 3 the next day, 2 the following and 1 the last day- all with food, Disp: 21 tablet, Rfl: 0    Current Allergies     Allergies as of 12/04/2019 - Reviewed 12/04/2019   Allergen Reaction Noted    Peanut oil Anaphylaxis 12/15/2017    Penicillins Anaphylaxis 12/15/2017              Past Medical History:   Diagnosis Date    Allergic     Asthma     Eczema        History reviewed  No pertinent surgical history  Family History   Family history unknown: Yes         Medications have been verified      The following portions of the patient's history were reviewed and updated as appropriate: allergies, current medications, past family history, past medical history, past social history, past surgical history and problem list     Objective   /70   Pulse 79   Temp 99 1 °F (37 3 °C)   Resp 16   Ht 5' 7" (1 702 m)   Wt 84 4 kg (186 lb)   SpO2 98%   BMI 29 13 kg/m²      Physical Exam     Physical Exam   Constitutional: She appears well-developed and well-nourished  No distress  HENT:   TMs are erythematous bilaterally without injection or bulging  Clear fluid behind  Nasal mucosa is erythematous and edematous  Posterior oropharynx is clear without erythema, edema, tonsillar hypertrophy or exudates  +clear PND   Neck: Normal range of motion  Cardiovascular: Normal rate, regular rhythm and normal heart sounds  Pulmonary/Chest: Effort normal and breath sounds normal  No stridor  No respiratory distress  She has no wheezes  She has no rales  Full and equal breath sounds throughout all lung fields  No decreased or absent breath sounds  No rhonchi rales or wheezing  She is speaking in full sentences without difficulty  No coughing at any point during exam even with deep inspiration  Lymphadenopathy:     She has no cervical adenopathy  Skin: She is not diaphoretic  Nursing note and vitals reviewed

## 2020-05-18 ENCOUNTER — OFFICE VISIT (OUTPATIENT)
Dept: FAMILY MEDICINE CLINIC | Facility: CLINIC | Age: 20
End: 2020-05-18
Payer: COMMERCIAL

## 2020-05-18 VITALS
WEIGHT: 193.4 LBS | TEMPERATURE: 98.6 F | SYSTOLIC BLOOD PRESSURE: 114 MMHG | HEIGHT: 67 IN | BODY MASS INDEX: 30.35 KG/M2 | HEART RATE: 80 BPM | RESPIRATION RATE: 15 BRPM | DIASTOLIC BLOOD PRESSURE: 72 MMHG

## 2020-05-18 DIAGNOSIS — J30.2 SEASONAL ALLERGIES: ICD-10-CM

## 2020-05-18 DIAGNOSIS — Z91.010 PEANUT ALLERGY: ICD-10-CM

## 2020-05-18 DIAGNOSIS — J45.21 MILD INTERMITTENT ASTHMA WITH EXACERBATION: ICD-10-CM

## 2020-05-18 DIAGNOSIS — J45.901 ACUTE ASTHMA EXACERBATION: ICD-10-CM

## 2020-05-18 DIAGNOSIS — J45.20 ASTHMA IN ADULT, MILD INTERMITTENT, UNCOMPLICATED: ICD-10-CM

## 2020-05-18 DIAGNOSIS — Z23 NEED FOR TDAP VACCINATION: ICD-10-CM

## 2020-05-18 DIAGNOSIS — Z00.00 ROUTINE GENERAL MEDICAL EXAMINATION AT A HEALTH CARE FACILITY: Primary | ICD-10-CM

## 2020-05-18 DIAGNOSIS — Z91.018 MULTIPLE FOOD ALLERGIES: ICD-10-CM

## 2020-05-18 PROBLEM — E87.6 HYPOKALEMIA: Status: RESOLVED | Noted: 2019-03-12 | Resolved: 2020-05-18

## 2020-05-18 PROBLEM — A41.9 SEPSIS (HCC): Status: RESOLVED | Noted: 2019-03-11 | Resolved: 2020-05-18

## 2020-05-18 PROCEDURE — 3008F BODY MASS INDEX DOCD: CPT | Performed by: FAMILY MEDICINE

## 2020-05-18 PROCEDURE — 90471 IMMUNIZATION ADMIN: CPT | Performed by: FAMILY MEDICINE

## 2020-05-18 PROCEDURE — 90715 TDAP VACCINE 7 YRS/> IM: CPT | Performed by: FAMILY MEDICINE

## 2020-05-18 PROCEDURE — 1036F TOBACCO NON-USER: CPT | Performed by: FAMILY MEDICINE

## 2020-05-18 PROCEDURE — 99395 PREV VISIT EST AGE 18-39: CPT | Performed by: FAMILY MEDICINE

## 2020-05-18 PROCEDURE — 99213 OFFICE O/P EST LOW 20 MIN: CPT | Performed by: FAMILY MEDICINE

## 2020-05-18 RX ORDER — ALBUTEROL SULFATE 90 UG/1
2 AEROSOL, METERED RESPIRATORY (INHALATION) EVERY 4 HOURS PRN
Qty: 1 INHALER | Refills: 0 | Status: SHIPPED | OUTPATIENT
Start: 2020-05-18

## 2020-05-18 RX ORDER — FEXOFENADINE HCL 180 MG/1
180 TABLET ORAL DAILY
Refills: 0
Start: 2020-05-18

## 2020-05-18 RX ORDER — EPINEPHRINE 0.3 MG/.3ML
0.3 INJECTION SUBCUTANEOUS ONCE
COMMUNITY
End: 2020-05-18 | Stop reason: SDUPTHER

## 2020-05-18 RX ORDER — EPINEPHRINE 0.3 MG/.3ML
0.3 INJECTION SUBCUTANEOUS ONCE
Qty: 0.6 ML | Refills: 3 | Status: SHIPPED | OUTPATIENT
Start: 2020-05-18 | End: 2020-05-18

## 2020-05-18 RX ORDER — BUDESONIDE AND FORMOTEROL FUMARATE DIHYDRATE 160; 4.5 UG/1; UG/1
2 AEROSOL RESPIRATORY (INHALATION) 2 TIMES DAILY
Qty: 1 INHALER | Refills: 5 | Status: SHIPPED | OUTPATIENT
Start: 2020-05-18 | End: 2021-02-15 | Stop reason: ALTCHOICE

## 2020-05-21 ENCOUNTER — TELEPHONE (OUTPATIENT)
Dept: FAMILY MEDICINE CLINIC | Facility: CLINIC | Age: 20
End: 2020-05-21

## 2020-06-05 DIAGNOSIS — J45.21 MILD INTERMITTENT ASTHMA WITH EXACERBATION: ICD-10-CM

## 2020-06-05 RX ORDER — ALBUTEROL SULFATE 90 UG/1
AEROSOL, METERED RESPIRATORY (INHALATION)
Qty: 18 INHALER | Refills: 0 | OUTPATIENT
Start: 2020-06-05

## 2020-06-08 ENCOUNTER — OPTICAL OFFICE (OUTPATIENT)
Dept: URBAN - METROPOLITAN AREA CLINIC 143 | Facility: CLINIC | Age: 20
Setting detail: OPHTHALMOLOGY
End: 2020-06-08
Payer: COMMERCIAL

## 2020-06-08 ENCOUNTER — DOCTOR'S OFFICE (OUTPATIENT)
Dept: URBAN - METROPOLITAN AREA CLINIC 136 | Facility: CLINIC | Age: 20
Setting detail: OPHTHALMOLOGY
End: 2020-06-08
Payer: COMMERCIAL

## 2020-06-08 DIAGNOSIS — H52.03: ICD-10-CM

## 2020-06-08 PROBLEM — H53.042 AMBLYOPIA SUSPECT, LEFT EYE: Status: ACTIVE | Noted: 2018-09-17

## 2020-06-08 PROCEDURE — 92015 DETERMINE REFRACTIVE STATE: CPT | Performed by: OPTOMETRIST

## 2020-06-08 PROCEDURE — V2020 VISION SVCS FRAMES PURCHASES: HCPCS | Performed by: OPTOMETRIST

## 2020-06-08 PROCEDURE — V2100 LENS SPHER SINGLE PLANO 4.00: HCPCS | Performed by: OPTOMETRIST

## 2020-06-08 PROCEDURE — 92014 COMPRE OPH EXAM EST PT 1/>: CPT | Performed by: OPTOMETRIST

## 2020-06-08 ASSESSMENT — REFRACTION_AUTOREFRACTION
OD_AXIS: 147
OD_CYLINDER: -0.75
OS_CYLINDER: -0.50
OS_AXIS: 003
OD_SPHERE: +1.50
OS_SPHERE: +4.00

## 2020-06-08 ASSESSMENT — REFRACTION_MANIFEST
OS_VA2: 20/20
OU_VA: 20/20
OS_CYLINDER: SPH
OD_SPHERE: +0.75
OD_CYLINDER: SPH
OD_VA2: 20/20
OS_VA1: 20/20
OD_VA1: 20/20
OS_SPHERE: +1.75

## 2020-06-08 ASSESSMENT — VISUAL ACUITY
OS_BCVA: 20/20-2
OD_BCVA: 20/20-2

## 2020-06-08 ASSESSMENT — CONFRONTATIONAL VISUAL FIELD TEST (CVF)
OD_FINDINGS: FULL
OS_FINDINGS: FULL

## 2020-06-08 ASSESSMENT — SPHEQUIV_DERIVED
OD_SPHEQUIV: 1.125
OS_SPHEQUIV: 3.75

## 2020-08-03 ENCOUNTER — OFFICE VISIT (OUTPATIENT)
Dept: URGENT CARE | Facility: CLINIC | Age: 20
End: 2020-08-03
Payer: COMMERCIAL

## 2020-08-03 VITALS — RESPIRATION RATE: 14 BRPM | OXYGEN SATURATION: 95 % | HEART RATE: 80 BPM | TEMPERATURE: 99.1 F

## 2020-08-03 DIAGNOSIS — R11.0 NAUSEA: Primary | ICD-10-CM

## 2020-08-03 PROCEDURE — G0382 LEV 3 HOSP TYPE B ED VISIT: HCPCS | Performed by: FAMILY MEDICINE

## 2020-08-03 PROCEDURE — 99283 EMERGENCY DEPT VISIT LOW MDM: CPT | Performed by: FAMILY MEDICINE

## 2020-08-03 PROCEDURE — 99203 OFFICE O/P NEW LOW 30 MIN: CPT | Performed by: FAMILY MEDICINE

## 2020-08-03 PROCEDURE — U0003 INFECTIOUS AGENT DETECTION BY NUCLEIC ACID (DNA OR RNA); SEVERE ACUTE RESPIRATORY SYNDROME CORONAVIRUS 2 (SARS-COV-2) (CORONAVIRUS DISEASE [COVID-19]), AMPLIFIED PROBE TECHNIQUE, MAKING USE OF HIGH THROUGHPUT TECHNOLOGIES AS DESCRIBED BY CMS-2020-01-R: HCPCS | Performed by: FAMILY MEDICINE

## 2020-08-03 RX ORDER — ONDANSETRON 4 MG/1
4 TABLET, ORALLY DISINTEGRATING ORAL EVERY 6 HOURS PRN
Qty: 20 TABLET | Refills: 0 | Status: SHIPPED | OUTPATIENT
Start: 2020-08-03

## 2020-08-03 NOTE — PATIENT INSTRUCTIONS
F/u here as needed  Self quarantine  Go to ER if worse symptoms      COVID-19 Home Care Guidelines    Your healthcare provider and/or public health staff have evaluated you and have determined that you do not need to remain in the hospital at this time  At this time you can be isolated at home where you will be monitored by staff from your local or state health department  You should carefully follow the prevention and isolation steps below until a healthcare provider or local or state health department says that you can return to your normal activities  Stay home except to get medical care    People who are mildly ill with COVID-19 are able to isolate at home during their illness  You should restrict activities outside your home, except for getting medical care  Do not go to work, school, or public areas  Avoid using public transportation, ride-sharing, or taxis  Separate yourself from other people and animals in your home    People: As much as possible, you should stay in a specific room and away from other people in your home  Also, you should use a separate bathroom, if available  Animals: You should restrict contact with pets and other animals while you are sick with COVID-19, just like you would around other people  Although there have not been reports of pets or other animals becoming sick with COVID-19, it is still recommended that people sick with COVID-19 limit contact with animals until more information is known about the virus  When possible, have another member of your household care for your animals while you are sick  If you are sick with COVID-19, avoid contact with your pet, including petting, snuggling, being kissed or licked, and sharing food  If you must care for your pet or be around animals while you are sick, wash your hands before and after you interact with pets and wear a facemask  See COVID-19 and Animals for more information      Call ahead before visiting your doctor    If you have a medical appointment, call the healthcare provider and tell them that you have or may have COVID-19  This will help the healthcare providers office take steps to keep other people from getting infected or exposed  Wear a facemask    You should wear a facemask when you are around other people (e g , sharing a room or vehicle) or pets and before you enter a healthcare providers office  If you are not able to wear a facemask (for example, because it causes trouble breathing), then people who live with you should not stay in the same room with you, or they should wear a facemask if they enter your room  Cover your coughs and sneezes    Cover your mouth and nose with a tissue when you cough or sneeze  Throw used tissues in a lined trash can  Immediately wash your hands with soap and water for at least 20 seconds or, if soap and water are not available, clean your hands with an alcohol-based hand  that contains at least 60% alcohol  Clean your hands often    Wash your hands often with soap and water for at least 20 seconds, especially after blowing your nose, coughing, or sneezing; going to the bathroom; and before eating or preparing food  If soap and water are not readily available, use an alcohol-based hand  with at least 60% alcohol, covering all surfaces of your hands and rubbing them together until they feel dry  Soap and water are the best option if hands are visibly dirty  Avoid touching your eyes, nose, and mouth with unwashed hands  Avoid sharing personal household items    You should not share dishes, drinking glasses, cups, eating utensils, towels, or bedding with other people or pets in your home  After using these items, they should be washed thoroughly with soap and water  Clean all high-touch surfaces everyday    High touch surfaces include counters, tabletops, doorknobs, bathroom fixtures, toilets, phones, keyboards, tablets, and bedside tables   Also, clean any surfaces that may have blood, stool, or body fluids on them  Use a household cleaning spray or wipe, according to the label instructions  Labels contain instructions for safe and effective use of the cleaning product including precautions you should take when applying the product, such as wearing gloves and making sure you have good ventilation during use of the product  Monitor your symptoms    Seek prompt medical attention if your illness is worsening (e g , difficulty breathing)  Before seeking care, call your healthcare provider and tell them that you have, or are being evaluated for, COVID-19  Put on a facemask before you enter the facility  These steps will help the healthcare providers office to keep other people in the office or waiting room from getting infected or exposed  Ask your healthcare provider to call the local or [facility] health department  Persons who are placed under active monitoring or facilitated self-monitoring should follow instructions provided by their local health department or occupational health professionals, as appropriate  If you have a medical emergency and need to call 911, notify the dispatch personnel that you have, or are being evaluated for COVID-19  If possible, put on a facemask before emergency medical services arrive  Discontinuing home isolation    Patients with confirmed COVID-19 should remain under home isolation precautions until the following conditions are met:   - They have had no fever for at least 24 hours (that is one full day of no fever without the use medicine that reduces fevers)  AND  - other symptoms have improved (for example, when their cough or shortness of breath have improved)  AND  - at least 10 days have passed since their symptoms first appeared  Patients with confirmed COVID-19 should also notify close contacts (including their workplace) and ask that they self-quarantine   Currently, close contact is defined as being within 6 feet for 10 minutes or more from the period 48 hours before symptom onset to the time at which the patient went into isolation  Close contacts of patients diagnosed with COVID-19 should be instructed by the patient to self-quarantine for 14 days from the last time of their last contact with the patient       Source: [employer]

## 2020-08-03 NOTE — PROGRESS NOTES
NAME: Cathy Elmore is a 23 y o  female  : 2000    MRN: [id number]      Assessment and Plan   Nausea [R11 0]  1  Nausea  ondansetron (ZOFRAN-ODT) 4 mg disintegrating tablet    Novel Coronavirus (COVID-19), PCR [facility] - Office Collection           Patient Instructions   Patient Instructions   F/u here as needed  Self quarantine  Go to ER if worse symptoms      COVID-19 Home Care Guidelines    Your healthcare provider and/or public health staff have evaluated you and have determined that you do not need to remain in the hospital at this time  At this time you can be isolated at home where you will be monitored by staff from your local or state health department  You should carefully follow the prevention and isolation steps below until a healthcare provider or local or state health department says that you can return to your normal activities  Stay home except to get medical care    People who are mildly ill with COVID-19 are able to isolate at home during their illness  You should restrict activities outside your home, except for getting medical care  Do not go to work, school, or public areas  Avoid using public transportation, ride-sharing, or taxis  Separate yourself from other people and animals in your home    People: As much as possible, you should stay in a specific room and away from other people in your home  Also, you should use a separate bathroom, if available  Animals: You should restrict contact with pets and other animals while you are sick with COVID-19, just like you would around other people  Although there have not been reports of pets or other animals becoming sick with COVID-19, it is still recommended that people sick with COVID-19 limit contact with animals until more information is known about the virus  When possible, have another member of your household care for your animals while you are sick   If you are sick with COVID-19, avoid contact with your pet, including petting, snuggling, being kissed or licked, and sharing food  If you must care for your pet or be around animals while you are sick, wash your hands before and after you interact with pets and wear a facemask  See COVID-19 and Animals for more information  Call ahead before visiting your doctor    If you have a medical appointment, call the healthcare provider and tell them that you have or may have COVID-19  This will help the healthcare providers office take steps to keep other people from getting infected or exposed  Wear a facemask    You should wear a facemask when you are around other people (e g , sharing a room or vehicle) or pets and before you enter a healthcare providers office  If you are not able to wear a facemask (for example, because it causes trouble breathing), then people who live with you should not stay in the same room with you, or they should wear a facemask if they enter your room  Cover your coughs and sneezes    Cover your mouth and nose with a tissue when you cough or sneeze  Throw used tissues in a lined trash can  Immediately wash your hands with soap and water for at least 20 seconds or, if soap and water are not available, clean your hands with an alcohol-based hand  that contains at least 60% alcohol  Clean your hands often    Wash your hands often with soap and water for at least 20 seconds, especially after blowing your nose, coughing, or sneezing; going to the bathroom; and before eating or preparing food  If soap and water are not readily available, use an alcohol-based hand  with at least 60% alcohol, covering all surfaces of your hands and rubbing them together until they feel dry  Soap and water are the best option if hands are visibly dirty  Avoid touching your eyes, nose, and mouth with unwashed hands      Avoid sharing personal household items    You should not share dishes, drinking glasses, cups, eating utensils, towels, or bedding with other people or pets in your home  After using these items, they should be washed thoroughly with soap and water  Clean all high-touch surfaces everyday    High touch surfaces include counters, tabletops, doorknobs, bathroom fixtures, toilets, phones, keyboards, tablets, and bedside tables  Also, clean any surfaces that may have blood, stool, or body fluids on them  Use a household cleaning spray or wipe, according to the label instructions  Labels contain instructions for safe and effective use of the cleaning product including precautions you should take when applying the product, such as wearing gloves and making sure you have good ventilation during use of the product  Monitor your symptoms    Seek prompt medical attention if your illness is worsening (e g , difficulty breathing)  Before seeking care, call your healthcare provider and tell them that you have, or are being evaluated for, COVID-19  Put on a facemask before you enter the facility  These steps will help the healthcare providers office to keep other people in the office or waiting room from getting infected or exposed  Ask your healthcare provider to call the local or [facility] health department  Persons who are placed under active monitoring or facilitated self-monitoring should follow instructions provided by their local health department or occupational health professionals, as appropriate  If you have a medical emergency and need to call 911, notify the dispatch personnel that you have, or are being evaluated for COVID-19  If possible, put on a facemask before emergency medical services arrive      Discontinuing home isolation    Patients with confirmed COVID-19 should remain under home isolation precautions until the following conditions are met:   - They have had no fever for at least 24 hours (that is one full day of no fever without the use medicine that reduces fevers)  AND  - other symptoms have improved (for example, when their cough or shortness of breath have improved)  AND  - at least 10 days have passed since their symptoms first appeared  Patients with confirmed COVID-19 should also notify close contacts (including their workplace) and ask that they self-quarantine  Currently, close contact is defined as being within 6 feet for 10 minutes or more from the period 48 hours before symptom onset to the time at which the patient went into isolation  Close contacts of patients diagnosed with COVID-19 should be instructed by the patient to self-quarantine for 14 days from the last time of their last contact with the patient  Source: [employer]      Proceed to ER if symptoms worsen  Chief Complaint     Chief Complaint   Patient presents with    Abdominal Pain     started saturday night         History of Present Illness   Here c/o stomach pain since Friday night  Vomiting/nausea/diarrhea- burns in her stomach after eating  Sharp pain  Took tums- no help  Denies fevers  Ate a salad from [employer]  Denies fevers/exposure/travel  Watery diarrhea/no blood- 2x yesterday, today 1 x      Review of Systems   Review of Systems   Constitutional: Negative for fatigue and fever  HENT: Negative for trouble swallowing  Respiratory: Negative for cough, chest tightness and shortness of breath  Cardiovascular: Negative for chest pain  Gastrointestinal: Positive for abdominal pain, diarrhea, nausea and vomiting  Genitourinary: Negative for difficulty urinating and dysuria  Skin: Negative for rash and wound  Neurological: Negative for weakness and headaches           Current Medications       Current Outpatient Medications:     albuterol (5 mg/mL) 0 5 % nebulizer solution, Take 0 5 mL (2 5 mg total) by nebulization every 6 (six) hours as needed for wheezing or shortness of breath, Disp: 100 mL, Rfl: 0    albuterol (PROVENTIL HFA,VENTOLIN HFA) 90 mcg/act inhaler, Inhale 2 puffs every 4 (four) hours as needed for wheezing, Disp: 1 Inhaler, Rfl: 0    budesonide-formoterol (SYMBICORT) 160-4 5 mcg/act inhaler, Inhale 2 puffs 2 (two) times a day Rinse mouth after use , Disp: 1 Inhaler, Rfl: 5    fexofenadine (ALLEGRA) 180 MG tablet, Take 1 tablet (180 mg total) by mouth daily, Disp: , Rfl: 0    guaiFENesin (MUCINEX) 600 mg 12 hr tablet, Take 1 tablet (600 mg total) by mouth 2 (two) times a day, Disp: 12 tablet, Rfl: 0    EPINEPHrine (EPIPEN) 0 3 mg/0 3 mL SOAJ, Inject 0 3 mL (0 3 mg total) into a muscle once for 1 dose, Disp: 0 6 mL, Rfl: 3    ondansetron (ZOFRAN-ODT) 4 mg disintegrating tablet, Take 1 tablet (4 mg total) by mouth every 6 (six) hours as needed for nausea or vomiting, Disp: 20 tablet, Rfl: 0    Current Allergies     Allergies as of 08/03/2020 - Reviewed 08/03/2020   Allergen Reaction Noted    Peanut oil Anaphylaxis 12/15/2017    Penicillins Anaphylaxis 12/15/2017              Past Medical History:   Diagnosis Date    Allergic     Asthma     Eczema        History reviewed  No pertinent surgical history  Family History   Problem Relation Age of Onset    Diabetes Paternal Grandmother          Medications have been verified  The following portions of the patient's history were reviewed and updated as appropriate: allergies, current medications, past family history, past medical history, past social history, past surgical history and problem list     Objective   Pulse 80   Temp 99 1 °F (37 3 °C)   Resp 14   SpO2 95%      Physical Exam     Physical Exam   Constitutional: She is oriented to person, place, and time  She appears well-developed  HENT:   Head: Normocephalic  Neck: Normal range of motion  Cardiovascular: Normal rate, regular rhythm and normal heart sounds  Exam reveals no gallop and no friction rub  No murmur heard  Pulmonary/Chest: Effort normal and breath sounds normal  No respiratory distress  She has no wheezes  She has no rales  Abdominal: Soft  Bowel sounds are normal  She exhibits no distension  There is abdominal tenderness in the epigastric area  There is no rebound and no guarding  Musculoskeletal: Normal range of motion  Neurological: She is oriented to person, place, and time  Skin: Skin is warm and dry  No rash noted  Psychiatric: Thought content normal    Nursing note and vitals reviewed

## 2020-08-03 NOTE — LETTER
August 3, 2020     Patient: Andrea Castleman   YOB: 2000   Date of Visit: 8/3/2020       To Whom it May Concern:    Andrea Castleman was seen in my clinic on 8/3/2020  She is excused from school due to illness 8/3-13/2020  If you have any questions or concerns, please don't hesitate to call           Sincerely,          Star Mora MD        CC: No Recipients

## 2020-08-05 ENCOUNTER — HOSPITAL ENCOUNTER (EMERGENCY)
Facility: HOSPITAL | Age: 20
Discharge: HOME | End: 2020-08-05
Attending: EMERGENCY MEDICINE | Admitting: EMERGENCY MEDICINE
Payer: COMMERCIAL

## 2020-08-05 ENCOUNTER — TELEPHONE (OUTPATIENT)
Dept: URGENT CARE | Facility: CLINIC | Age: 20
End: 2020-08-05

## 2020-08-05 VITALS
RESPIRATION RATE: 18 BRPM | OXYGEN SATURATION: 99 % | HEART RATE: 56 BPM | WEIGHT: 203.93 LBS | BODY MASS INDEX: 32.42 KG/M2 | TEMPERATURE: 99.1 F | SYSTOLIC BLOOD PRESSURE: 114 MMHG | DIASTOLIC BLOOD PRESSURE: 61 MMHG

## 2020-08-05 DIAGNOSIS — R10.9 ABDOMINAL PAIN: Primary | ICD-10-CM

## 2020-08-05 DIAGNOSIS — R11.2 NAUSEA AND VOMITING: ICD-10-CM

## 2020-08-05 LAB
ALBUMIN SERPL BCP-MCNC: 4.6 G/DL (ref 3–5.2)
ALP SERPL-CCNC: 48 U/L (ref 43–122)
ALT SERPL W P-5'-P-CCNC: 15 U/L (ref 9–52)
ANION GAP SERPL CALCULATED.3IONS-SCNC: 9 MMOL/L (ref 5–14)
AST SERPL W P-5'-P-CCNC: 21 U/L (ref 14–36)
BASOPHILS # BLD AUTO: 0.1 THOUSANDS/ΜL (ref 0–0.1)
BASOPHILS NFR BLD AUTO: 1 % (ref 0–1)
BILIRUB SERPL-MCNC: 0.5 MG/DL
BILIRUB UR QL STRIP: NEGATIVE
BUN SERPL-MCNC: 9 MG/DL (ref 5–25)
CALCIUM SERPL-MCNC: 9.7 MG/DL (ref 8.4–10.2)
CHLORIDE SERPL-SCNC: 104 MMOL/L (ref 97–108)
CLARITY UR: CLEAR
CO2 SERPL-SCNC: 26 MMOL/L (ref 22–30)
COLOR UR: ABNORMAL
CREAT SERPL-MCNC: 0.65 MG/DL (ref 0.6–1.2)
EOSINOPHIL # BLD AUTO: 0.4 THOUSAND/ΜL (ref 0–0.4)
EOSINOPHIL NFR BLD AUTO: 4 % (ref 0–6)
ERYTHROCYTE [DISTWIDTH] IN BLOOD BY AUTOMATED COUNT: 12.6 %
EXT PREG TEST URINE: NEGATIVE
EXT. CONTROL ED NAV: NORMAL
GFR SERPL CREATININE-BSD FRML MDRD: 126 ML/MIN/1.73SQ M
GLUCOSE SERPL-MCNC: 106 MG/DL (ref 70–99)
GLUCOSE UR STRIP-MCNC: NEGATIVE MG/DL
HCT VFR BLD AUTO: 43 % (ref 36–46)
HGB BLD-MCNC: 14.6 G/DL (ref 12–16)
HGB UR QL STRIP.AUTO: NEGATIVE
KETONES UR STRIP-MCNC: NEGATIVE MG/DL
LEUKOCYTE ESTERASE UR QL STRIP: NEGATIVE
LIPASE SERPL-CCNC: 65 U/L (ref 23–300)
LYMPHOCYTES # BLD AUTO: 1.4 THOUSANDS/ΜL (ref 0.5–4)
LYMPHOCYTES NFR BLD AUTO: 14 % (ref 25–45)
MCH RBC QN AUTO: 31 PG (ref 26–34)
MCHC RBC AUTO-ENTMCNC: 33.9 G/DL (ref 31–36)
MCV RBC AUTO: 91 FL (ref 80–100)
MONOCYTES # BLD AUTO: 0.6 THOUSAND/ΜL (ref 0.2–0.9)
MONOCYTES NFR BLD AUTO: 6 % (ref 1–10)
NEUTROPHILS # BLD AUTO: 7.6 THOUSANDS/ΜL (ref 1.8–7.8)
NEUTS SEG NFR BLD AUTO: 76 % (ref 45–65)
NITRITE UR QL STRIP: NEGATIVE
PH UR STRIP.AUTO: 6 [PH]
PLATELET # BLD AUTO: 278 THOUSANDS/UL (ref 150–450)
PMV BLD AUTO: 8.8 FL (ref 8.9–12.7)
POTASSIUM SERPL-SCNC: 3.9 MMOL/L (ref 3.6–5)
PROT SERPL-MCNC: 8.2 G/DL (ref 5.9–8.4)
PROT UR STRIP-MCNC: NEGATIVE MG/DL
RBC # BLD AUTO: 4.71 MILLION/UL (ref 4–5.2)
SARS-COV-2 RNA SPEC QL NAA+PROBE: NOT DETECTED
SODIUM SERPL-SCNC: 139 MMOL/L (ref 137–147)
SP GR UR STRIP.AUTO: 1.02 (ref 1–1.04)
UROBILINOGEN UA: NEGATIVE MG/DL
WBC # BLD AUTO: 10 THOUSAND/UL (ref 4.5–11)

## 2020-08-05 PROCEDURE — 83690 ASSAY OF LIPASE: CPT | Performed by: EMERGENCY MEDICINE

## 2020-08-05 PROCEDURE — 36415 COLL VENOUS BLD VENIPUNCTURE: CPT | Performed by: EMERGENCY MEDICINE

## 2020-08-05 PROCEDURE — 96374 THER/PROPH/DIAG INJ IV PUSH: CPT

## 2020-08-05 PROCEDURE — 96361 HYDRATE IV INFUSION ADD-ON: CPT

## 2020-08-05 PROCEDURE — 85025 COMPLETE CBC W/AUTO DIFF WBC: CPT | Performed by: EMERGENCY MEDICINE

## 2020-08-05 PROCEDURE — 99284 EMERGENCY DEPT VISIT MOD MDM: CPT | Performed by: EMERGENCY MEDICINE

## 2020-08-05 PROCEDURE — 81025 URINE PREGNANCY TEST: CPT | Performed by: EMERGENCY MEDICINE

## 2020-08-05 PROCEDURE — 80053 COMPREHEN METABOLIC PANEL: CPT | Performed by: EMERGENCY MEDICINE

## 2020-08-05 PROCEDURE — 99284 EMERGENCY DEPT VISIT MOD MDM: CPT

## 2020-08-05 RX ORDER — ONDANSETRON 4 MG/1
4 TABLET, FILM COATED ORAL EVERY 6 HOURS
Qty: 12 TABLET | Refills: 0 | Status: SHIPPED | OUTPATIENT
Start: 2020-08-05

## 2020-08-05 RX ADMIN — SODIUM CHLORIDE 1000 ML: 0.9 INJECTION, SOLUTION INTRAVENOUS at 07:07

## 2020-08-05 RX ADMIN — FAMOTIDINE 20 MG: 10 INJECTION INTRAVENOUS at 07:11

## 2020-08-05 NOTE — Clinical Note
Shravan Leiva was seen and treated in our emergency department on 8/5/2020  Other - See Comments        Diagnosis:     Hannah    She may return on 08/05/2020  No work/school until covid test is back or 14 days  If you have any questions or concerns, please don't hesitate to call        J Carlos Hernandez,     ______________________________           _______________          _______________  Hospital Representative                              Date                                Time

## 2020-08-05 NOTE — ED PROVIDER NOTES
History  Chief Complaint   Patient presents with    Abdominal Pain     ABD pain since saturday after drinking a "gross drink from [employer] " Vomiting and diarrhea since Sunday  Sharp pain is in epigastric region  Patient is a 68-year-old female presenting for concerns of epigastric pain associated with nausea vomiting  Patient states that 2 days ago she started eating a banana salad and then shortly afterwards began having nausea vomiting epigastric pain  She was seen in urgent care prescribe Zofran and underwent testing for coronavirus for she does not know results yet  She states that the Zofran has controlled the nausea and vomiting however she has persistent epigastric and left upper quadrant abdominal pain  No associated diarrhea  Denies previous abdominal surgeries  Denies recent travel or other sick contacts  She states her boyfriend was eating at [employer] and did not get sickle that he did not have self  History provided by:  Patient   used: No    Abdominal Pain   Associated symptoms: nausea and vomiting    Associated symptoms: no chest pain, no chills, no cough, no diarrhea, no dysuria, no fever, no shortness of breath and no sore throat        Prior to Admission Medications   Prescriptions Last Dose Informant Patient Reported? Taking? EPINEPHrine (EPIPEN) 0 3 mg/0 3 mL SOAJ   No No   Sig: Inject 0 3 mL (0 3 mg total) into a muscle once for 1 dose   albuterol (5 mg/mL) 0 5 % nebulizer solution   No No   Sig: Take 0 5 mL (2 5 mg total) by nebulization every 6 (six) hours as needed for wheezing or shortness of breath   albuterol (PROVENTIL HFA,VENTOLIN HFA) 90 mcg/act inhaler   No No   Sig: Inhale 2 puffs every 4 (four) hours as needed for wheezing   budesonide-formoterol (SYMBICORT) 160-4 5 mcg/act inhaler   No No   Sig: Inhale 2 puffs 2 (two) times a day Rinse mouth after use     fexofenadine (ALLEGRA) 180 MG tablet   No No   Sig: Take 1 tablet (180 mg total) by mouth daily   guaiFENesin (MUCINEX) 600 mg 12 hr tablet   No No   Sig: Take 1 tablet (600 mg total) by mouth 2 (two) times a day   ondansetron (ZOFRAN-ODT) 4 mg disintegrating tablet   No No   Sig: Take 1 tablet (4 mg total) by mouth every 6 (six) hours as needed for nausea or vomiting      Facility-Administered Medications: None       Past Medical History:   Diagnosis Date    Allergic     Asthma     Eczema        History reviewed  No pertinent surgical history  Family History   Problem Relation Age of Onset    Diabetes Paternal Grandmother      I have reviewed and agree with the history as documented  E-Cigarette/Vaping    E-Cigarette Use Never User      E-Cigarette/Vaping Substances     Social History     Tobacco Use    Smoking status: Never Smoker    Smokeless tobacco: Never Used   Substance Use Topics    Alcohol use: Never     Frequency: Never     Drinks per session: Patient refused     Binge frequency: Never    Drug use: No       Review of Systems   Constitutional: Negative  Negative for chills and fever  HENT: Negative  Negative for rhinorrhea, sore throat, trouble swallowing and voice change  Eyes: Negative  Negative for pain and visual disturbance  Respiratory: Negative  Negative for cough, shortness of breath and wheezing  Cardiovascular: Negative  Negative for chest pain and palpitations  Gastrointestinal: Positive for abdominal pain, nausea and vomiting  Negative for diarrhea  Genitourinary: Negative  Negative for dysuria and frequency  Musculoskeletal: Negative  Negative for neck pain and neck stiffness  Skin: Negative  Negative for rash  Neurological: Negative  Negative for dizziness, speech difficulty, weakness, light-headedness and numbness  Physical Exam  Physical Exam  Vitals signs and nursing note reviewed  Constitutional:       General: She is not in acute distress  Appearance: She is well-developed     HENT:      Head: Normocephalic and atraumatic  Eyes:      Conjunctiva/sclera: Conjunctivae normal       Pupils: Pupils are equal, round, and reactive to light  Neck:      Musculoskeletal: Normal range of motion and neck supple  Trachea: No tracheal deviation  Cardiovascular:      Rate and Rhythm: Normal rate and regular rhythm  Pulmonary:      Effort: Pulmonary effort is normal  No respiratory distress  Breath sounds: Normal breath sounds  No wheezing or rales  Abdominal:      General: Bowel sounds are normal  There is no distension  Palpations: Abdomen is soft  Tenderness: There is abdominal tenderness in the left upper quadrant  There is no guarding or rebound  Musculoskeletal: Normal range of motion  General: No tenderness or deformity  Skin:     General: Skin is warm and dry  Capillary Refill: Capillary refill takes less than 2 seconds  Findings: No rash  Neurological:      Mental Status: She is alert and oriented to person, place, and time     Psychiatric:         Behavior: Behavior normal          Vital Signs  ED Triage Vitals [08/05/20 0640]   Temperature Pulse Respirations Blood Pressure SpO2   99 1 °F (37 3 °C) 77 18 109/71 99 %      Temp Source Heart Rate Source Patient Position - Orthostatic VS BP Location FiO2 (%)   Tympanic Monitor Sitting Left arm --      Pain Score       --           Vitals:    08/05/20 0640   BP: 109/71   Pulse: 77   Patient Position - Orthostatic VS: Sitting         Visual Acuity      ED Medications  Medications   sodium chloride 0 9 % bolus 1,000 mL (has no administration in time range)   famotidine (PEPCID) injection 20 mg (has no administration in time range)       Diagnostic Studies  Results Reviewed     Procedure Component Value Units Date/Time    CBC and differential [[id number]]     Lab Status:  No result Specimen:  Blood     Lipase [[id number]]     Lab Status:  No result Specimen:  Blood     Comprehensive metabolic panel [[id number]]     Lab Status:  No result Specimen:  Blood     UA (URINE) with reflex to Scope [[id number]]     Lab Status:  No result Specimen:  Urine     POCT pregnancy, urine [[id number]]     Lab Status:  No result                  No orders to display              Procedures  Procedures         ED Course       US AUDIT      Most Recent Value   Initial Alcohol Screen: US AUDIT-C    1  How often do you have a drink containing alcohol? 1 Filed at: 08/05/2020 0641   2  How many drinks containing alcohol do you have on a typical day you are drinking? 1 Filed at: 08/05/2020 0641   3b  FEMALE Any Age, or MALE 65+: How often do you have 4 or more drinks on one occassion? 0 Filed at: 08/05/2020 0641   Audit-C Score  2 Filed at: 08/05/2020 7950                  DIMPLE/DAST-10      Most Recent Value   How many times in the past year have you    Used an illegal drug or used a prescription medication for non-medical reasons? Never Filed at: 08/05/2020 9369                                [facility]  Number of Diagnoses or Management Options  Diagnosis management comments: Patient is a well-appearing 78-year-old female, vitals reviewed, afebrile  Blood pressure heart rate are okay as well  Patient has minor tenderness to palpation left upper quadrant  No rebound or guarding was appreciated  Symptoms are improving after Zofran given at Urgent Care  Will obtain screening blood work, continue IV fluid hydration and if labs are okay patient can be discharged home for further supportive care  Patient signed out to oncoming physician Dr Alessandra Caruso for follow up on labwork and need for further imaging          Amount and/or Complexity of Data Reviewed  Clinical lab tests: ordered          Disposition  Final diagnoses:   Abdominal pain   Nausea and vomiting     Time reflects when diagnosis was documented in both MDM as applicable and the Disposition within this note     Time User Action Codes Description Comment    8/5/2020  6:52 AM Cherie Garcia Add [R10 9] Abdominal pain     8/5/2020  6:52 AM Richard Marrufo Add [R11 2] Nausea and vomiting       ED Disposition     None      Follow-up Information     Follow up With Specialties Details Why Contact Info Additional Information    Andie Eaton DO Family Medicine   [address] [facility] [address] [facility] [address] [facility]  [address] [facility]   [address]  [address] [facility] [address]  Ramez Valdez [address] [facility] [place], [address], [address] [place] [address], [place], [place], [id number] [phone]          Patient's Medications   Discharge Prescriptions    No medications on file     No discharge procedures on file      PDMP Review     None          ED Provider  Electronically Signed by           Amira Coats DO  08/05/20 5624

## 2020-08-05 NOTE — ED CARE HANDOFF
Emergency Department Sign Out Note      ED Course / Workup Pending (followup):  THIS 23YEAR-OLD FEMALE THAT SIGNED OUT TO ME FOR NOTED ABDOMINAL PAIN  Nausea vomiting  The patient feels much improved at this point time labs reviewed with the patient and unremarkable  Pending COVID test at this point time she would like to be discharged home  Given a note for work at this point time plan outpatient management follow-up given strict instructions when to return back to the emergency department  Pt re-examined and evaluated after testing and treatment  Spoke with the patient and feeling improved and sxs have resolved  Will discharge home with close f/u with pcp and instructed to return to the ED if sxs worsen or continue  Pt agrees with the plan for discharge and feels comfortable to go home with proper f/u  Advised to return for worsening or additional problems  Diagnostic tests were reviewed and questions answered  Diagnosis, care plan and treatment options were discussed  The patient understand instructions and will follow up as directed  Counseling: I had a detailed discussion with the patient and/or guardian regarding: the historical points, exam findings, and any diagnostic results supporting the discharge diagnosis, lab results, radiology results, discharge instructions reviewed with patient and/or family/caregiver and understanding was verbalized  Instructions given to return to the emergency department if symptoms worsen or persist, or if there are any questions or concerns that arise at home       All labs reviewed and utilized in the medical decision making process    All radiology studies independently viewed by me and interpreted by the radiologist                                  Procedures  MDM    Disposition  Final diagnoses:   Abdominal pain   Nausea and vomiting     Time reflects when diagnosis was documented in both MDM as applicable and the Disposition within this note     Time User Action Codes Description Comment    8/5/2020  6:52 AM Hiral Poisson Add [R10 9] Abdominal pain     8/5/2020  6:52 AM Hiral Poisson Add [R11 2] Nausea and vomiting       ED Disposition     None      Follow-up Information     Follow up With Specialties Details Why Contact Info Additional Information    Whit Silva DO Family Medicine   [address]       [facility]   [address]  [address] [facility] [address] [address] [facility], [address], [address] [facility] [address], [facility], [place], [address] [phone]        Patient's Medications   Discharge Prescriptions    No medications on file     No discharge procedures on file         ED Provider  Electronically Signed by     Neo Flores DO  08/05/20 0028

## 2020-08-06 ENCOUNTER — TELEPHONE (OUTPATIENT)
Dept: URGENT CARE | Facility: CLINIC | Age: 20
End: 2020-08-06

## 2020-08-10 ENCOUNTER — TELEPHONE (OUTPATIENT)
Dept: URGENT CARE | Facility: CLINIC | Age: 20
End: 2020-08-10

## 2021-01-16 NOTE — PROGRESS NOTES
ASSESSMENT/PLAN:    Mild intermittent asthma  Patient only on albuterol as needed  She will get her pulmonary function test and will see what she needs     Peanut allergy/multiple food allergies  Patient is EpiPen    Patient experienced some anxious moments  We talked about exercising least 3 times a week in order to help get rid of any anxiety      Patient will get a pulmonary function test pre and post at the hospital  We will call her with results  Since she is going to soon work at [employer] most likely her insurance will change and she will need to do her medical followups to there  If not she will let us now        BMI Counseling: Body mass index is 31 04 kg/m²  The BMI is above normal  Nutrition recommendations include decreasing portion sizes and encouraging healthy choices of fruits and vegetables  Patient already down 9 lb             Health Maintenance   Topic Date Due    Pneumococcal Vaccine: Pediatrics (0 to 5 Years) and At-Risk Patients (6 to 59 Years) (1 of 1 - PPSV23) 03/10/2004    HIV Screening  03/10/2013    Chlamydia Screening  03/10/2014    Cervical Cancer Screening  03/10/2019    Influenza Vaccine (1) 09/01/2020    BMI: Followup Plan  05/18/2021    Annual Physical  05/18/2021    Depression Screening PHQ  01/18/2022    BMI: Adult  01/18/2022    DTaP,Tdap,and Td Vaccines (7 - Td) 05/18/2030    HIB Vaccine  Completed    Hepatitis B Vaccine  Completed    IPV Vaccine  Completed    Hepatitis A Vaccine  Completed    Meningococcal ACWY Vaccine  Completed    HPV Vaccine  Completed         Problem List as of 1/18/2021 Reviewed: 5/18/2020 10:37 AM by Sailaja West DO    Acute asthma exacerbation    Last Assessment & Plan 3/11/2019 Hospital Encounter Written 3/13/2019  9:18 AM by Duran Rodriguez MD     · Patient was initially seen in the ED earlier today for mild exacerbation; was discharged home with prednisone taper and used her nebulizer without relief    States she did not start taking the prednisone yet  · Febrile, tachypneic and tachycardic on arrival to ED  SpO2 95% on RA  · States she does feel improved after nebulizer treatments  However has been rather tachycardic after albuterol, will switch to DuoNebs with levalbuterol for future treatments  · Since patient did not take prednisone yet today, will give loading dose of 125 mg Solu-Medrol  Continue IV steroids 40 mg Q 8 hours for now  · Acute exacerbation likely in setting of viral URI  Check procalcitonin, hold off on further antibiotics for now  · Influenza negative  · Afebrile tachycardia improved  · Feels much better  · Peak flow to 280 but poor effort  · Lungs are bilateral wheezing but much more clear good breath sound at bases  · No tachycardia noted tachypneic does much better ready to be discharged  · Patient does not have steroid inhaler patient received education about her asthma treatment with respiratory check also from me  ·   ·              Asthma in adult, mild intermittent, uncomplicated    Multiple food allergies    Seasonal allergies            Subjective:   Chief Complaint   Patient presents with    Asthma    Anxiety     discuss     Patient here to recheck on her asthma  She did have 1 episode since last visit  She has never had a pulmonary function test and we are hoping to do that today except COVID is still in the middle of the 2nd surge  Patient since last visit to get her gyn exam done  She also went to medical [employer] school and 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 patient ID: Yvette Cassidy is a 23 y o  female  Past Medical History:   Diagnosis Date    Allergic     Asthma     Eczema      History reviewed  No pertinent surgical history    Family History   Problem Relation Age of Onset    Diabetes Paternal Grandmother      Social History     Tobacco Use    Smoking status: Never Smoker    Smokeless tobacco: Never Used   Substance Use Topics    Alcohol use: Never     Frequency: Never     Drinks per session: Patient refused     Binge frequency: Never    Drug use: No     Social History     Tobacco Use   Smoking Status Never Smoker   Smokeless Tobacco Never Used        MED LIST WAS REVIEWED AND UPDATED       ROS  As per HPI  Rest of 12 point review of systems negative     Objective:      VITALS:  Wt Readings from Last 3 Encounters:   01/18/21 87 2 kg (192 lb 4 8 oz)   08/05/20 92 5 kg (203 lb 14 8 oz)   05/18/20 87 7 kg (193 lb 6 4 oz)     BP Readings from Last 3 Encounters:   01/18/21 118/76   08/05/20 114/61   05/18/20 114/72     Pulse Readings from Last 3 Encounters:   01/18/21 80   08/05/20 56   08/03/20 80     Body mass index is 31 04 kg/m²  Laboratory Results:    All pertinent labs and studies were reviewed with patient during this office visit  with highlights of the results contained in this notes ASSESSMENT AND PLAN section       Physical Exam    Constitutional  Appears healthy, Looks well, Appearance consistent with age    Mental Status  Alert, Oriented, Cooperative, Memory function normal , clean, and reasonable    Neck  No neck mass, No thyromegaly, Good carotid upstrokes bilaterally, trachea midline positive click    Respiratory  Breath sounds normal, No rales, No rhonchi, No wheezing, normal palpation    Cardiac   Regular rhythm without ectopy or murmur no S3-S4, no heave lift or thrill to palpation    Vascular  No leg edema, No pedal edema    Muscular skeletal  No clubbing cyanosis , muscle tone normal    Skin  No appreciable rashes or abnormal appearing lesions

## 2021-01-18 ENCOUNTER — OFFICE VISIT (OUTPATIENT)
Dept: FAMILY MEDICINE CLINIC | Facility: CLINIC | Age: 21
End: 2021-01-18
Payer: COMMERCIAL

## 2021-01-18 VITALS
TEMPERATURE: 98.2 F | DIASTOLIC BLOOD PRESSURE: 76 MMHG | HEART RATE: 80 BPM | RESPIRATION RATE: 15 BRPM | BODY MASS INDEX: 30.91 KG/M2 | SYSTOLIC BLOOD PRESSURE: 118 MMHG | HEIGHT: 66 IN | WEIGHT: 192.3 LBS

## 2021-01-18 DIAGNOSIS — J45.20 ASTHMA IN ADULT, MILD INTERMITTENT, UNCOMPLICATED: Primary | ICD-10-CM

## 2021-01-18 PROBLEM — J45.901 ACUTE ASTHMA EXACERBATION: Status: RESOLVED | Noted: 2019-03-11 | Resolved: 2021-01-18

## 2021-01-18 PROCEDURE — 1036F TOBACCO NON-USER: CPT | Performed by: FAMILY MEDICINE

## 2021-01-18 PROCEDURE — 99214 OFFICE O/P EST MOD 30 MIN: CPT | Performed by: FAMILY MEDICINE

## 2021-01-18 PROCEDURE — 3008F BODY MASS INDEX DOCD: CPT | Performed by: FAMILY MEDICINE

## 2021-01-18 PROCEDURE — 3725F SCREEN DEPRESSION PERFORMED: CPT | Performed by: FAMILY MEDICINE

## 2021-01-18 NOTE — PATIENT INSTRUCTIONS
Patient experienced some anxious moments  We talked about exercising least 3 times a week in order to help get rid of any anxiety      Mild intermittent asthma  Patient only on albuterol as needed  She will get her pulmonary function test and will see what she needs     Peanut allergy/multiple food allergies  Patient is EpiPen      Patient will get a pulmonary function test pre and post at the hospital  We will call her with results  Since she is going to soon work at [employer] most likely her insurance will change and she will need to do her medical followups to there    If not she will let us now

## 2021-01-19 ENCOUNTER — TELEPHONE (OUTPATIENT)
Dept: ADMINISTRATIVE | Facility: OTHER | Age: 21
End: 2021-01-19

## 2021-01-19 NOTE — TELEPHONE ENCOUNTER
Upon review of the In Basket request we were able to locate, review, and update the patient chart as requested for Pap Smear (HPV) aka Cervical Cancer Screening  Any additional questions or concerns should be emailed to the Practice Liaisons via [email or web address] email, please do not reply via In Basket      Thank you  Turner Rodrigues

## 2021-01-19 NOTE — TELEPHONE ENCOUNTER
----- Message from Eloy Merlos MA sent at 1/18/2021  1:12 PM EST -----  01/18/21 1:13 PM    Hello, our patient Archana Carlton has had pap completed/performed   Please assist in updating the patient chart by is in the lab section The date of service is 11/10/2020     Thank you,  Eloy Merlos MA  [facility]

## 2021-02-13 ENCOUNTER — HOSPITAL ENCOUNTER (OUTPATIENT)
Dept: PULMONOLOGY | Facility: HOSPITAL | Age: 21
Discharge: HOME | End: 2021-02-13
Payer: COMMERCIAL

## 2021-02-13 DIAGNOSIS — J45.20 ASTHMA IN ADULT, MILD INTERMITTENT, UNCOMPLICATED: ICD-10-CM

## 2021-02-13 PROCEDURE — 94726 PLETHYSMOGRAPHY LUNG VOLUMES: CPT | Performed by: INTERNAL MEDICINE

## 2021-02-13 PROCEDURE — 94729 DIFFUSING CAPACITY: CPT | Performed by: INTERNAL MEDICINE

## 2021-02-13 PROCEDURE — 94760 N-INVAS EAR/PLS OXIMETRY 1: CPT

## 2021-02-13 PROCEDURE — 94729 DIFFUSING CAPACITY: CPT

## 2021-02-13 PROCEDURE — 94060 EVALUATION OF WHEEZING: CPT | Performed by: INTERNAL MEDICINE

## 2021-02-13 PROCEDURE — 94726 PLETHYSMOGRAPHY LUNG VOLUMES: CPT

## 2021-02-13 PROCEDURE — 94060 EVALUATION OF WHEEZING: CPT

## 2021-02-15 ENCOUNTER — TELEPHONE (OUTPATIENT)
Dept: FAMILY MEDICINE CLINIC | Facility: CLINIC | Age: 21
End: 2021-02-15

## 2021-02-15 DIAGNOSIS — J45.20 ASTHMA IN ADULT, MILD INTERMITTENT, UNCOMPLICATED: Primary | ICD-10-CM

## 2021-02-15 NOTE — TELEPHONE ENCOUNTER
----- Message from Claudeen Cass, DO sent at 2/15/2021 12:05 PM EST -----  Call patient regarding her pulmonary function test   It shows mild obstructive asthma with no change after she did the bronchodilator  There are some inhalers that we can try preventative leave for her to use once daily to see if it makes her feel better   If she is fine at this point just using albuterol as needed, we can do that   As well  Just let me know  Left message to call back

## 2021-02-15 NOTE — RESULT ENCOUNTER NOTE
Call patient regarding her pulmonary function test   It shows mild obstructive asthma with no change after she did the bronchodilator  There are some inhalers that we can try preventative leave for her to use once daily to see if it makes her feel better   If she is fine at this point just using albuterol as needed, we can do that   As well  Just let me know

## 2021-02-15 NOTE — TELEPHONE ENCOUNTER
Patient would like to try preventative inhaler she started a new job and when she walks from parking lot to work she is very out of breathe      [facility]

## 2021-02-16 NOTE — PROGRESS NOTES
Patient had a pulmonary function test that showed mild-to-moderate obstructive disease   No change after bronchodilator next     Ask patient if she wanted a prevention inhaler in she asked for 1 because she is having a hard time walking from the parking lot to her place of employment without getting short of breath    She will try [facility] [employer] for the next 3 months   Pharmacist will demonstrate how to use the inhaler   She will rinse her mouth her eat something after she uses the inhaler    She will come back in 3 months for her recheck

## 2021-02-16 NOTE — TELEPHONE ENCOUNTER
[facility] prescription called in  She will try Advair 250/50 for the next 3 months   She will take 1 puff twice daily  Pharmacist will demonstrate how to use the inhaler   She will rinse her mouth her eat something after she uses the inhaler    She will come back in 3 months for her recheck

## 2021-02-18 DIAGNOSIS — J45.20 ASTHMA IN ADULT, MILD INTERMITTENT, UNCOMPLICATED: Primary | ICD-10-CM

## 2021-07-12 ENCOUNTER — APPOINTMENT (OUTPATIENT)
Dept: LAB | Facility: CLINIC | Age: 21
End: 2021-07-12
Payer: COMMERCIAL

## 2021-07-12 DIAGNOSIS — Z79.899 ENCOUNTER FOR LONG-TERM (CURRENT) USE OF OTHER MEDICATIONS: ICD-10-CM

## 2021-07-12 LAB
ALBUMIN SERPL BCP-MCNC: 3.7 G/DL (ref 3.5–5)
ALP SERPL-CCNC: 49 U/L (ref 46–116)
ALT SERPL W P-5'-P-CCNC: 23 U/L (ref 12–78)
ANION GAP SERPL CALCULATED.3IONS-SCNC: 5 MMOL/L (ref 4–13)
AST SERPL W P-5'-P-CCNC: 16 U/L (ref 5–45)
BASOPHILS # BLD AUTO: 0.08 THOUSANDS/ΜL (ref 0–0.1)
BASOPHILS NFR BLD AUTO: 1 % (ref 0–1)
BILIRUB SERPL-MCNC: 0.46 MG/DL (ref 0.2–1)
BUN SERPL-MCNC: 9 MG/DL (ref 5–25)
CALCIUM SERPL-MCNC: 9.6 MG/DL (ref 8.3–10.1)
CHLORIDE SERPL-SCNC: 107 MMOL/L (ref 100–108)
CO2 SERPL-SCNC: 24 MMOL/L (ref 21–32)
CREAT SERPL-MCNC: 0.63 MG/DL (ref 0.6–1.3)
EOSINOPHIL # BLD AUTO: 0.35 THOUSAND/ΜL (ref 0–0.61)
EOSINOPHIL NFR BLD AUTO: 5 % (ref 0–6)
ERYTHROCYTE [DISTWIDTH] IN BLOOD BY AUTOMATED COUNT: 12.2 % (ref 11.6–15.1)
GFR SERPL CREATININE-BSD FRML MDRD: 127 ML/MIN/1.73SQ M
GLUCOSE P FAST SERPL-MCNC: 90 MG/DL (ref 65–99)
HCT VFR BLD AUTO: 41.4 % (ref 34.8–46.1)
HGB BLD-MCNC: 13.7 G/DL (ref 11.5–15.4)
IMM GRANULOCYTES # BLD AUTO: 0.03 THOUSAND/UL (ref 0–0.2)
IMM GRANULOCYTES NFR BLD AUTO: 0 % (ref 0–2)
LYMPHOCYTES # BLD AUTO: 1.7 THOUSANDS/ΜL (ref 0.6–4.47)
LYMPHOCYTES NFR BLD AUTO: 23 % (ref 14–44)
MCH RBC QN AUTO: 30.7 PG (ref 26.8–34.3)
MCHC RBC AUTO-ENTMCNC: 33.1 G/DL (ref 31.4–37.4)
MCV RBC AUTO: 93 FL (ref 82–98)
MONOCYTES # BLD AUTO: 0.56 THOUSAND/ΜL (ref 0.17–1.22)
MONOCYTES NFR BLD AUTO: 7 % (ref 4–12)
NEUTROPHILS # BLD AUTO: 4.85 THOUSANDS/ΜL (ref 1.85–7.62)
NEUTS SEG NFR BLD AUTO: 64 % (ref 43–75)
NRBC BLD AUTO-RTO: 0 /100 WBCS
PLATELET # BLD AUTO: 292 THOUSANDS/UL (ref 149–390)
PMV BLD AUTO: 11 FL (ref 8.9–12.7)
POTASSIUM SERPL-SCNC: 4.2 MMOL/L (ref 3.5–5.3)
PROT SERPL-MCNC: 7.7 G/DL (ref 6.4–8.2)
RBC # BLD AUTO: 4.46 MILLION/UL (ref 3.81–5.12)
SODIUM SERPL-SCNC: 136 MMOL/L (ref 136–145)
TSH SERPL DL<=0.05 MIU/L-ACNC: 2.94 UIU/ML (ref 0.36–3.74)
WBC # BLD AUTO: 7.57 THOUSAND/UL (ref 4.31–10.16)

## 2021-07-12 PROCEDURE — 36415 COLL VENOUS BLD VENIPUNCTURE: CPT

## 2021-07-12 PROCEDURE — 85025 COMPLETE CBC W/AUTO DIFF WBC: CPT

## 2021-07-12 PROCEDURE — 80053 COMPREHEN METABOLIC PANEL: CPT

## 2021-07-12 PROCEDURE — 84443 ASSAY THYROID STIM HORMONE: CPT

## 2021-08-26 ENCOUNTER — APPOINTMENT (EMERGENCY)
Dept: RADIOLOGY | Facility: HOSPITAL | Age: 21
End: 2021-08-26
Payer: COMMERCIAL

## 2021-08-26 ENCOUNTER — HOSPITAL ENCOUNTER (EMERGENCY)
Facility: HOSPITAL | Age: 21
Discharge: HOME | End: 2021-08-26
Attending: EMERGENCY MEDICINE
Payer: COMMERCIAL

## 2021-08-26 VITALS
RESPIRATION RATE: 16 BRPM | BODY MASS INDEX: 31.96 KG/M2 | OXYGEN SATURATION: 98 % | HEART RATE: 84 BPM | TEMPERATURE: 97.6 F | WEIGHT: 198 LBS | DIASTOLIC BLOOD PRESSURE: 74 MMHG | SYSTOLIC BLOOD PRESSURE: 126 MMHG

## 2021-08-26 DIAGNOSIS — S63.501A SPRAIN OF RIGHT WRIST, INITIAL ENCOUNTER: ICD-10-CM
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
## 2021-08-26 DIAGNOSIS — M25.551 RIGHT HIP PAIN: ICD-10-CM

## 2021-08-26 LAB
EXT PREG TEST URINE: NEGATIVE
EXT. CONTROL ED NAV: NORMAL

## 2021-08-26 PROCEDURE — 99284 EMERGENCY DEPT VISIT MOD MDM: CPT | Performed by: EMERGENCY MEDICINE

## 2021-08-26 PROCEDURE — 73110 X-RAY EXAM OF WRIST: CPT

## 2021-08-26 PROCEDURE — 96372 THER/PROPH/DIAG INJ SC/IM: CPT

## 2021-08-26 PROCEDURE — 81025 URINE PREGNANCY TEST: CPT | Performed by: EMERGENCY MEDICINE

## 2021-08-26 PROCEDURE — 99283 EMERGENCY DEPT VISIT LOW MDM: CPT

## 2021-08-26 PROCEDURE — 73130 X-RAY EXAM OF HAND: CPT

## 2021-08-26 PROCEDURE — 73502 X-RAY EXAM HIP UNI 2-3 VIEWS: CPT

## 2021-08-26 RX ORDER — CYCLOBENZAPRINE HCL 10 MG
10 TABLET ORAL 2 TIMES DAILY PRN
Qty: 20 TABLET | Refills: 0 | Status: SHIPPED | OUTPATIENT
Start: 2021-08-26

## 2021-08-26 RX ORDER — KETOROLAC TROMETHAMINE 30 MG/ML
30 INJECTION, SOLUTION INTRAMUSCULAR; INTRAVENOUS ONCE
Status: COMPLETED | OUTPATIENT
Start: 2021-08-26 | End: 2021-08-26

## 2021-08-26 RX ORDER — NAPROXEN 500 MG/1
500 TABLET ORAL 2 TIMES DAILY WITH MEALS
Qty: 30 TABLET | Refills: 0 | Status: SHIPPED | OUTPATIENT
Start: 2021-08-26

## 2021-08-26 RX ORDER — CYCLOBENZAPRINE HCL 10 MG
10 TABLET ORAL ONCE
Status: COMPLETED | OUTPATIENT
Start: 2021-08-26 | End: 2021-08-26

## 2021-08-26 RX ADMIN — KETOROLAC TROMETHAMINE 30 MG: 30 INJECTION, SOLUTION INTRAMUSCULAR; INTRAVENOUS at 22:57

## 2021-08-26 RX ADMIN — CYCLOBENZAPRINE HYDROCHLORIDE 10 MG: 10 TABLET, FILM COATED ORAL at 22:57

## 2021-08-26 NOTE — Clinical Note
Olga Carranza was seen and treated in our emergency department on 8/26/2021  Diagnosis:     Shashank Saleh  may return to work on return date  She may return on this date: 08/28/2021         If you have any questions or concerns, please don't hesitate to call        Braden Ramos MD    ______________________________           _______________          _______________  Hospital Representative                              Date                                Time
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
## 2022-10-10 ENCOUNTER — HOSPITAL ENCOUNTER (EMERGENCY)
Facility: HOSPITAL | Age: 22
Discharge: HOME | End: 2022-10-10
Attending: EMERGENCY MEDICINE
Payer: COMMERCIAL

## 2022-10-10 ENCOUNTER — APPOINTMENT (EMERGENCY)
Dept: CT IMAGING | Facility: HOSPITAL | Age: 22
End: 2022-10-10
Payer: COMMERCIAL

## 2022-10-10 VITALS
BODY MASS INDEX: 32.68 KG/M2 | OXYGEN SATURATION: 97 % | HEART RATE: 79 BPM | SYSTOLIC BLOOD PRESSURE: 128 MMHG | TEMPERATURE: 97.8 F | DIASTOLIC BLOOD PRESSURE: 69 MMHG | RESPIRATION RATE: 16 BRPM | WEIGHT: 202.5 LBS
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
## 2022-10-10 DIAGNOSIS — J32.9 SINUSITIS: ICD-10-CM

## 2022-10-10 DIAGNOSIS — S16.1XXA STRAIN OF NECK MUSCLE, INITIAL ENCOUNTER: ICD-10-CM

## 2022-10-10 DIAGNOSIS — S06.0XAA CONCUSSION: ICD-10-CM

## 2022-10-10 PROCEDURE — 72125 CT NECK SPINE W/O DYE: CPT

## 2022-10-10 PROCEDURE — 99284 EMERGENCY DEPT VISIT MOD MDM: CPT | Performed by: EMERGENCY MEDICINE

## 2022-10-10 PROCEDURE — 99284 EMERGENCY DEPT VISIT MOD MDM: CPT

## 2022-10-10 PROCEDURE — 70450 CT HEAD/BRAIN W/O DYE: CPT

## 2022-10-10 RX ORDER — FLUTICASONE PROPIONATE 50 MCG
2 SPRAY, SUSPENSION (ML) NASAL DAILY
Qty: 16 G | Refills: 0 | Status: SHIPPED | OUTPATIENT
Start: 2022-10-10

## 2022-10-10 RX ORDER — ACETAMINOPHEN 325 MG/1
975 TABLET ORAL ONCE
Status: COMPLETED | OUTPATIENT
Start: 2022-10-10 | End: 2022-10-10

## 2022-10-10 RX ORDER — SULFAMETHOXAZOLE AND TRIMETHOPRIM 800; 160 MG/1; MG/1
1 TABLET ORAL 2 TIMES DAILY
Qty: 20 TABLET | Refills: 0 | Status: SHIPPED | OUTPATIENT
Start: 2022-10-10 | End: 2022-10-20

## 2022-10-10 RX ORDER — IBUPROFEN 800 MG/1
800 TABLET ORAL EVERY 6 HOURS PRN
Qty: 30 TABLET | Refills: 0 | Status: SHIPPED | OUTPATIENT
Start: 2022-10-10

## 2022-10-10 RX ADMIN — ACETAMINOPHEN 975 MG: 325 TABLET, FILM COATED ORAL at 22:16

## 2022-10-10 NOTE — Clinical Note
Lis Jama was seen and treated in our emergency department on 10/10/2022  Diagnosis:     Corina Wood  may return to work on return date  She may return on this date: 10/12/2022         If you have any questions or concerns, please don't hesitate to call        Allegra Rubin MD    ______________________________           _______________          _______________  Hospital Representative                              Date                                Time
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
## 2024-02-09 NOTE — ASSESSMENT & PLAN NOTE
· Potassium noted to be 3 1 at time of admission  · Had been repleted with 40 mEq in the ED  · Re-check BMP in AM as well as magnesium  · Electrolyte normal "I don't know"

## 2025-02-17 ENCOUNTER — OFFICE VISIT (OUTPATIENT)
Dept: DERMATOLOGY | Facility: CLINIC | Age: 25
End: 2025-02-17
Payer: COMMERCIAL

## 2025-02-17 VITALS — TEMPERATURE: 98.4 F | WEIGHT: 206 LBS | BODY MASS INDEX: 33.25 KG/M2

## 2025-02-17 DIAGNOSIS — L20.9 ATOPIC DERMATITIS, UNSPECIFIED TYPE: Primary | ICD-10-CM

## 2025-02-17 DIAGNOSIS — L02.92 BOIL: ICD-10-CM

## 2025-02-17 PROCEDURE — 99204 OFFICE O/P NEW MOD 45 MIN: CPT | Performed by: REGISTERED NURSE

## 2025-02-17 RX ORDER — CLOBETASOL PROPIONATE 0.5 MG/G
OINTMENT TOPICAL
Qty: 60 G | Refills: 2 | Status: SHIPPED | OUTPATIENT
Start: 2025-02-17

## 2025-02-17 RX ORDER — TRIAMCINOLONE ACETONIDE 1 MG/G
OINTMENT TOPICAL 2 TIMES DAILY
Qty: 453.6 G | Refills: 1 | Status: SHIPPED | OUTPATIENT
Start: 2025-02-17

## 2025-02-17 NOTE — PROGRESS NOTES
"[facility] Note     Patient Name: Hannah Cheatham  Encounter Date: 02/17/2025     Have you been cared for by a [facility]ologist in the last 3 years and, if so, which description applies to you?    NO.   I am considered a \"new\" patient and must complete all patient intake questions. I am FEMALE/of child-bearing potential.    REVIEW OF SYSTEMS:  Have you recently had or currently have any of the following? Recent fever or chills? No  Any non-healing wound? No  Are you pregnant or planning to become pregnant? No  Are you currently or planning to be nursing or breast feeding? No   PAST MEDICAL HISTORY:  Have you personally ever had or currently have any of the following?  If \"YES,\" then please provide more detail. Skin cancer (such as Melanoma, Basal Cell Carcinoma, Squamous Cell Carcinoma?  No  Tuberculosis, HIV/AIDS, Hepatitis B or C: No  Radiation Treatment No   HISTORY OF IMMUNOSUPPRESSION:   Do you have a history of any of the following:  Systemic Immunosuppression such as Diabetes, Biologic or Immunotherapy, Chemotherapy, Organ Transplantation, Bone Marrow Transplantation or Prednsione?  No    Answering \"YES\" requires the addition of the dotphrase \"IMMUNOSUPPRESSED\" as the first diagnosis of the patient's visit.   FAMILY HISTORY:  Any \"first degree relatives\" (parent, brother, sister, or child) with the following?    Skin Cancer, Pancreatic or Other Cancer? No   PATIENT EXPERIENCE:    Do you want the Dermatologist to perform a COMPLETE skin exam today including a clinical examination under the \"bra and underwear\" areas?  NO  If necessary, do we have your permission to call and leave a detailed message on your Preferred Phone number that includes your specific medical information?  Yes      Allergies   Allergen Reactions    Peanut Oil - Food Allergy Anaphylaxis    Penicillins Anaphylaxis      Current Outpatient Medications:     albuterol (5 mg/mL) 0.5 % nebulizer solution, Take 0.5 mL " (2.5 mg total) by nebulization every 6 (six) hours as needed for wheezing or shortness of breath, Disp: 100 mL, Rfl: 0    albuterol (PROVENTIL HFA,VENTOLIN HFA) 90 mcg/act inhaler, Inhale 2 puffs every 4 (four) hours as needed for wheezing, Disp: 1 Inhaler, Rfl: 0    cyclobenzaprine (FLEXERIL) 10 mg tablet, Take 1 tablet (10 mg total) by mouth 2 (two) times a day as needed for muscle spasms, Disp: 20 tablet, Rfl: 0    EPINEPHrine (EPIPEN) 0.3 mg/0.3 mL SOAJ, Inject 0.3 mL (0.3 mg total) into a muscle once for 1 dose, Disp: 0.6 mL, Rfl: 3    fexofenadine (ALLEGRA) 180 MG tablet, Take 1 tablet (180 mg total) by mouth daily, Disp: , Rfl: 0    fluticasone (FLONASE) 50 mcg/act nasal spray, 2 sprays into each nostril daily, Disp: 16 g, Rfl: 0    fluticasone-salmeterol (ADVAIR HFA) 115-21 MCG/ACT inhaler, Inhale 2 puffs 2 (two) times a day, Disp: 1 Inhaler, Rfl: 5    guaiFENesin (MUCINEX) 600 mg 12 hr tablet, Take 1 tablet (600 mg total) by mouth 2 (two) times a day, Disp: 12 tablet, Rfl: 0    ibuprofen (MOTRIN) 800 mg tablet, Take 1 tablet (800 mg total) by mouth every 6 (six) hours as needed (Pain), Disp: 30 tablet, Rfl: 0    naproxen (NAPROSYN) 500 mg tablet, Take 1 tablet (500 mg total) by mouth 2 (two) times a day with meals, Disp: 30 tablet, Rfl: 0    ondansetron (ZOFRAN) 4 mg tablet, Take 1 tablet (4 mg total) by mouth every 6 (six) hours, Disp: 12 tablet, Rfl: 0    ondansetron (ZOFRAN-ODT) 4 mg disintegrating tablet, Take 1 tablet (4 mg total) by mouth every 6 (six) hours as needed for nausea or vomiting, Disp: 20 tablet, Rfl: 0          Whom besides the patient is providing clinical information about today's encounter?   NO ADDITIONAL HISTORIAN (patient alone provided history)    Physical Exam and Assessment/Plan by Diagnosis:    ATOPIC DERMATITIS    Physical Exam:  Anatomic Location Affected:  hands, wrist, elbows cease, back of knees, neck  Morphological Description:  xerotic scaly plaques   Body Surface Area  "Today:  ~5%  Overall Severity: moderate  Pertinent Positives:  Pertinent Negatives:    Additional History of Present Condition:  New patient with eczema since childhood. Prescribed triamcinolone 0.1% cream states it doesn't always work all the time.     Assessment and Plan:  Based on a thorough discussion of this condition and the management approach to it (including a comprehensive discussion of the known risks, side effects and potential benefits of treatment), the patient (family) agrees to implement the following specific plan:  Triamcinolone 0.1% ointment FLARE TREATMENT:  Apply a thin layer TWICE A DAY to affected areas on the extremities and trunk for no more than 2 weeks straight. MAINTENANCE TREATMENT.  Apply a thin layer TWICE A DAY on \"Mondays through Fridays ONLY\" (do not apply on weekends). Do not apply to face, underarms or genitals unless directed.  Clobetasol 0.05% ointment FLARE TREATMENT:  Apply a thin layer TWICE A DAY to affected areas on the hands  skin for no more than 2 weeks straight. MAINTENANCE TREATMENT.  Apply a thin layer TWICE A DAY on \"Mondays, Wednesdays and Fridays ONLY\" (do not apply on weekends). Do not apply to face, underarms or genitals unless directed.  Recommend using only fragrance free detergent (not \"unscented\" products which contain a masking agent); we discussed avoiding irritants/fragranced products.  Start using Dove moisturizer bar soap in the shower    Patient to contact office via MyChart if eczema returns on face for another topical  Follow up as needed      Assessment and Plan:   Atopic Dermatitis is a chronic, itchy skin condition that is very common in children but may occur at any age. It is also known as “eczema” or “atopic eczema.” It is the most common form of dermatitis.    Atopic dermatitis usually occurs in people who have an “atopic tendency.”  This means they may develop any or all of these closely linked conditions:  Atopic dermatitis, asthma, hay fever " "(allergic rhinitis), eosinophilic esophagitis, and gastroenteritis.  Often these conditions run within families with a parent, child or sibling also affected. A family history of asthma, eczema or hay fever is particularly useful in diagnosing atopic dermatitis in infants.    Atopic dermatitis arises because of a complex interaction of genetic and environmental factors. These include defects in skin barrier function making the skin more susceptible to irritation by soap and other contact irritants, the weather, temperature and non-specific triggers.  There is also an element of immune system dysregulation that is often present.  By definition, it is chronic and has a \"waxing-waning\" nature; flares should be expected but with good education and treatment strategies can be minimized.    Some specific tips we discussed:  Dry skin care.  Using only mild cleansers (hypoallergenic and without fragrances) and fragrance free detergent (not “unscented” products which contain a masking agent); we discussed avoiding irritants/fragranced products.  The importance of regular application of moisturizers daily (at least 3 times a day)  The known and theoretical side effects of steroids at length, including but not limited to atrophy of skin and increased pressure in eye (glaucoma) and clouding of the eye's lens (cataracts) if used in or around the eye for extended durations.  The specific over-the-counter interventions and medications.  Side effects, risks and benefits of topical and oral medications discussed.  After lengthy discussion of etiology and treatment options, we decided to implement the following personalized treatment plan:      EDUCATION AS INTERVENTION!    WHAT IS ATOPIC DERMATITIS?  Atopic dermatitis (also called “eczema”) is a condition of the skin where the skin is dry, red, and itchy.  The main function of the skin is to provide a barrier from the environment and is also the first defense of the immune system.  "   In atopic dermatitis the skin barrier is decreased or disrupted, and the skin is easily irritated.  As a result, moisture escapes the skin more easily, and environmental allergens and microbes can enter the skin more easily.  Consequently, the skin's immune system is altered.  If there are increased allergic type cells in the skin, the skin may become red and “hyper-excitable.” This leads to itching and a subsequent rash.    WHY DO PEOPLE GET ATOPIC DERMATITIS?  There is no single answer because many factors are involved.  It is likely a combination of genetic makeup and environmental triggers and/or exposures. Excessive drying or sweating of the skin, Irritating soaps, dust mites, and pet dander are some of the more common triggers.  There is no blood test that can be done to confirm this diagnosis. The history and appearance of the skin is usually sufficient for a diagnosis. However, in some cases if the rash does not fit with the history or respond appropriately to treatment, a skin biopsy may be helpful.  Many children do outgrow atopic dermatitis or get better; however, many continue to have sensitive skin into adulthood.  Asthma and hay fever are often seen in many patients with atopic dermatitis; however, asthma flares do not necessarily occur at the same time as skin flares.     PREVENTING FLARES OF ATOPIC DERMATITIS  The first step is to maintain the skin's barrier function.  Keep the skin well moisturized.  Avoid irritants and triggers.  Use prescribed medicine when there are red or rough areas to help the skin to return to normal as quickly as possible.  Try to limit scratching.     If you keep the skin well moisturized, and avoid coming in contact with things you know irritate your child's skin, there will be less flares.  However, some flares of atopic dermatitis are beyond your control.  You should work with your health care provider to come up with a plan that minimizes flares while minimizing long  term use of medications that suppress the immune system.        WHAT ARE SOME OF THE TRIGGERS?  Triggers are different for different people. The most common triggers are:  Heat and sweat for some individuals, cold weather for others.  House dust mites, pet fur.  Wool; synthetic fabrics like nylon; dyed fabrics.  Tobacco smoke   Fragrances in: shampoos, soaps, lotions, laundry detergents, fabric softeners.  Saliva or prolonged exposure to water.    WHAT ABOUT FOOD ALLERGIES?  This is a very controversial topic, as many believe that food allergies are responsible for skin flares. In some cases, specific foods may cause worsening of atopic dermatitis; however this occurs in a minority of cases and usually happens within a few hours of ingestion. While food allergy is more common in children with eczema, foods are specific triggers for flares in only a small percentage of children.  If you notice that the skin flares after certain foods you can see if eliminating one food at time makes a difference, as long as your child can still enjoy a well-balanced diet.   There are blood (RAST) and skin (PRICK) tests that can check for allergies, but they are often positive in children who are not truly allergic. Therefore it is important that you work with your allergist and dermatologist to determine which foods are relevant and causing true symptoms.  Extreme food elimination diets without the guidance of your doctor, which have become more popular in recent years, may even result in worsening of the skin rash due to malnutrition and avoidance of essential nutrients.    TREATMENT  Treatments are aimed at minimizing exposure to irritating factors and decreasing  the skin inflammation which results in an itchy rash.There are many different treatment options, which depend on your child's rash, its location, and severity.  Topical treatments include corticosteroids and steroid-like creams such as Protopic, Elidel, and Eucrisa, which  are believed to not thin the skin.  Please read the discussions below regarding risks and benefits of all of these creams.    Occasionally bacterial or viral infections can occur which flare the skin and require oral and/or topical antibiotics or antivirals. In some cases bleach baths 2-3 times weekly can be helpful to prevent recurrent infection.    For severe disease, strong oral medications such as corticosteroids, methotrexate or azathioprine (Imuran) may be needed. These medications require close monitoring and follow-up. You should discuss the risks/ benefits/alternatives of these medications with your health care provider to come up with the best treatment plan for your child.    1) Use moisturizer all over the entire body at least THREE TIMES a day.  This keeps the skin moisturized to restore the barrier function.  Find a cream or ointment that your child likes - this is the most important.  The medicines do not work in the bottle.  The thicker the moisturizer, generally the better barrier it provides.  Ointments often moisturize better than creams; and creams work better than lotions.  Lotions are more useful during the summer when thick greasy ointments are uncomfortable.  If you put moisturizer on the skin after bathing, while the skin is damp, it is twice as effective.  The moisturizer provides a seal holding the water in the skin.  You may bathe your child in warm - not hot - water, for short periods of time (no more than 5-10 minutes at a time) once a day if they like.  Lightly pat your child dry with a towel and, while the skin is still damp, (within 3 minutes) apply a moisturizer from head to toe.  If your child is using a medicated cream, apply it and allow it to absorb completely BEFORE you apply the moisturizer.    2) Apply the prescription medication TWICE A DAY to only the red, rough areas on the skin OR AS DIRECTED BY YOUR HEALTH CARE PROVIDER  Put the medication on your fingers and gently rub  it into the areas.  Usually the medicine will help an area within a few days time.  Try to put the medicine on for two days after you have noticed that the redness is no longer present; this will help the redness from returning.  The severity of the rash and the strength and usage of the medication will determine how quickly you see improvement.    It is important that you do not overuse steroid creams, and if you notice a thin, shiny appearance to the skin or broken blood vessels, you should stop using the cream and consult your health care provider regarding possible overuse/overthinning of the skin.  The face, armpits and groin have particularly thin and sensitive skin and are therefore most at risk for bad results if steroids are over-used in these sites.      3) Avoid triggers.    Some children have specific things that trigger itching and rashes, while others may have none that can be identified.  It may require a little bit of trial and error to see what applies to your child.  Also, triggers can change over time for your child. The most common triggers are listed above; start with these.  Avoid the use of fabric softeners in the washing machine or dryer sheets (unless they are fragrance-free).  Try to use laundry detergents, soaps and shampoos that are fragrance-free. You may find it helpful to double-rinse your clothes.  Some children are sensitive to house dust mites and they may benefit from a plastic mattress wrap.  While food allergy is more common in children with eczema, foods are specific triggers for flares in only a small percentage of children.  If you notice that the skin flares after certain foods you can see if eliminating one food at time makes a difference, as long as your child can still enjoy a well-balanced diet.     4) Consider using a medication like an anti-histamine by mouth to help control the itching.    Scratching only makes the skin more reactive and the barrier function even more  "disrupted.  It can cause both children and their parents to lose sleep!  There are different types of anti-itch medications.  Some cause more drowsiness than others.  Both types are acceptable depending on your child and your preference.  Start with Benadryl and if that does not work, ask for a prescription “antihistamine.”    5) About the prescription creams:  Corticosteroid creams and ointments (generally things with \"-one\" or \"radha\" on the end of their names):  The strength of the cream or ointment depends on the name of the active ingredient.  The numbers at the end do not indicate the relative strength.  Thus triamcinolone 0.1% ointment, considered a mid-strength corticosteroid, is much stronger than hydrocortisone 1% even though the number following the name is much lower.  Topical corticosteroids are very effective in treating atopic dermatitis.  When used in the manner prescribed (to rashy areas of skin and for no more than a few weeks at a time to any one area) they are very safe.  These are corticosteroids and are anti-inflammatory, not the “anabolic steroids” like those used illegally by some athletes.     Topical non-steroid creams and ointments (immunomodulators):  These creams and ointments are also called topical calcineurin inhibitors (TCIs).  These include Protopic ointment and Elidel cream. Crisaborole 2% (Eucrisa) is a prescription ointment that targets an enzyme called PDE4 (phosphodiesterase 4).  It is used on the skin topically to treat mild-to-moderate eczema in adults and children 2 years of age and older.  In total, these nonsteroidal prescriptions are used to help decrease itching and redness in the skin.  They are not as strong as most steroid creams; however, it is believed that they do not thin the skin when overused.  They are generally used as second-line medications, though they may be used alone or in conjunction with topical steroids.  In sensitive areas such as the face, underarms " or groin, they are often recommended.  They can sting inflamed skin, but are generally well tolerated once the skin is healing.    The FDA placed a “black-box” warning on both Elidel and Protopic in 2006 based on animal studies using the medications.  Some animals developed skin cancer and lymphoma.  Subsequently, the FDA released a statement that there is no causal relationship between the two medications and cancer.  Because of this concern, there are ongoing studies to evaluate this relationship in humans.  So far, there are studies that support the safety of these medications.  One showed that the rates of cancer in patient using these medications topically were less than the rates of the general population and another showed that in patient's using the medication over a large area of the body, the levels of the medication in the blood was undetectable.    As for Eucrisa, this product is only approved for the topical treatment of mild-to-moderate eczema in patients 2 years of age and older; use of the medication in kids younger than 2 is considered “off label” and has not been formally studied.  Burning and stinging are the most commonly reported side effects of this medication.  Rarely, this product has been known to cause hives and hypersensitivity reactions; discontinue its use if you develop severe itching, swelling, or redness in the area of application.    FURUNCLE   Physical Exam:  Anatomic Location Affected:  right axilla  Morphological Description:  firm subcutaneous papule   Pertinent Positives:  Pertinent Negatives: no tenderness or drainage.     Additional History of Present Condition:  Reported by patient that she had a tender boil like lesion in the right axilla that has significantly improved over the past 2 weeks. She's never had a similar lesion before. She reports her dad has similar lesion that frequently recurs in his bilateral axilla.     Assessment and Plan:  Based on a thorough discussion  of this condition and the management approach to it (including a comprehensive discussion of the known risks, side effects and potential benefits of treatment), the patient (family) agrees to implement the following specific plan:  Lesion resolving no treatment needed  Advised to reach out to us for further evaluation if it flare up or becomes a recurring problem.       Scribe Attestation      I,:  Breanna Whyte MA am acting as a scribe while in the presence of the attending physician.:       I,:  Nestor Newman MD personally performed the services described in this documentation    as scribed in my presence.:

## 2025-04-15 ENCOUNTER — PATIENT MESSAGE (OUTPATIENT)
Dept: DERMATOLOGY | Facility: CLINIC | Age: 25
End: 2025-04-15

## 2025-04-21 ENCOUNTER — TELEPHONE (OUTPATIENT)
Age: 25
End: 2025-04-21

## 2025-04-21 NOTE — TELEPHONE ENCOUNTER
Patient called to schedule f/u derm appt, offered next available. Patient declined scheduling, stated she would not be in the [place] anymore.